# Patient Record
Sex: FEMALE | Race: WHITE | NOT HISPANIC OR LATINO | Employment: PART TIME | URBAN - METROPOLITAN AREA
[De-identification: names, ages, dates, MRNs, and addresses within clinical notes are randomized per-mention and may not be internally consistent; named-entity substitution may affect disease eponyms.]

---
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
## 2024-03-23 ENCOUNTER — HOSPITAL ENCOUNTER (INPATIENT)
Facility: HOSPITAL | Age: 19
LOS: 1 days | Discharge: HOME | DRG: 123 | End: 2024-03-26
Attending: STUDENT IN AN ORGANIZED HEALTH CARE EDUCATION/TRAINING PROGRAM | Admitting: INTERNAL MEDICINE
Payer: COMMERCIAL

## 2024-03-23 DIAGNOSIS — R07.9 CHEST PAIN: ICD-10-CM

## 2024-03-23 DIAGNOSIS — H54.61 VISION LOSS OF RIGHT EYE: ICD-10-CM

## 2024-03-23 DIAGNOSIS — H47.10 OPTIC NERVE EDEMA: Primary | ICD-10-CM

## 2024-03-23 DIAGNOSIS — H47.10 OPTIC DISC EDEMA: ICD-10-CM

## 2024-03-23 LAB
(HCYS)2 SERPL-MCNC: 7.2 UMOL/L (ref 5.1–15.4)
ALBUMIN SERPL-MCNC: 4.3 G/DL (ref 3.5–5)
ALBUMIN/GLOB SERPL: 1 RATIO (ref 1.1–2)
ALP SERPL-CCNC: 71 UNIT/L (ref 40–150)
ALT SERPL-CCNC: 18 UNIT/L (ref 0–55)
APPEARANCE UR: CLEAR
APTT PPP: 34.4 SECONDS (ref 23.2–33.7)
AST SERPL-CCNC: 19 UNIT/L (ref 5–34)
B-HCG UR QL: NEGATIVE
BACTERIA #/AREA URNS AUTO: ABNORMAL /HPF
BASOPHILS # BLD AUTO: 0.04 X10(3)/MCL
BASOPHILS NFR BLD AUTO: 0.4 %
BILIRUB SERPL-MCNC: 0.4 MG/DL
BILIRUB UR QL STRIP.AUTO: NEGATIVE
BUN SERPL-MCNC: 13.8 MG/DL (ref 7–18.7)
CALCIUM SERPL-MCNC: 11.1 MG/DL (ref 8.4–10.2)
CHLORIDE SERPL-SCNC: 107 MMOL/L (ref 98–107)
CO2 SERPL-SCNC: 22 MMOL/L (ref 22–29)
COLOR UR AUTO: ABNORMAL
CREAT SERPL-MCNC: 0.9 MG/DL (ref 0.55–1.02)
CTP QC/QA: YES
D DIMER PPP IA.FEU-MCNC: 0.31 UG/ML FEU (ref 0–0.5)
EOSINOPHIL # BLD AUTO: 0.07 X10(3)/MCL (ref 0–0.9)
EOSINOPHIL NFR BLD AUTO: 0.6 %
ERYTHROCYTE [DISTWIDTH] IN BLOOD BY AUTOMATED COUNT: 17.2 % (ref 11.5–17)
EST. AVERAGE GLUCOSE BLD GHB EST-MCNC: 99.7 MG/DL
FIBRINOGEN PPP-MCNC: 376 MG/DL (ref 210–463)
GFR SERPLBLD CREATININE-BSD FMLA CKD-EPI: >60 MLS/MIN/1.73/M2
GLOBULIN SER-MCNC: 4.3 GM/DL (ref 2.4–3.5)
GLUCOSE SERPL-MCNC: 101 MG/DL (ref 74–100)
GLUCOSE UR QL STRIP.AUTO: NORMAL
HBA1C MFR BLD: 5.1 %
HCT VFR BLD AUTO: 41.4 % (ref 37–47)
HGB BLD-MCNC: 13.4 G/DL (ref 12–16)
HIV 1+2 AB+HIV1 P24 AG SERPL QL IA: NONREACTIVE
HOLD SPECIMEN: NORMAL
HOLD SPECIMEN: NORMAL
HYALINE CASTS #/AREA URNS LPF: ABNORMAL /LPF
IMM GRANULOCYTES # BLD AUTO: 0.06 X10(3)/MCL (ref 0–0.04)
IMM GRANULOCYTES NFR BLD AUTO: 0.5 %
INR PPP: 1
KETONES UR QL STRIP.AUTO: NEGATIVE
LEUKOCYTE ESTERASE UR QL STRIP.AUTO: NEGATIVE
LYMPHOCYTES # BLD AUTO: 1.12 X10(3)/MCL (ref 0.6–4.6)
LYMPHOCYTES NFR BLD AUTO: 10.2 %
MAGNESIUM SERPL-MCNC: 1.9 MG/DL (ref 1.6–2.6)
MCH RBC QN AUTO: 26.8 PG (ref 27–31)
MCHC RBC AUTO-ENTMCNC: 32.4 G/DL (ref 33–36)
MCV RBC AUTO: 82.8 FL (ref 80–94)
MONOCYTES # BLD AUTO: 0.24 X10(3)/MCL (ref 0.1–1.3)
MONOCYTES NFR BLD AUTO: 2.2 %
MUCOUS THREADS URNS QL MICRO: ABNORMAL /LPF
NEUTROPHILS # BLD AUTO: 9.43 X10(3)/MCL (ref 2.1–9.2)
NEUTROPHILS NFR BLD AUTO: 86.1 %
NITRITE UR QL STRIP.AUTO: NEGATIVE
NRBC BLD AUTO-RTO: 0 %
PH UR STRIP.AUTO: 5.5 [PH]
PLATELET # BLD AUTO: 384 X10(3)/MCL (ref 130–400)
PMV BLD AUTO: 10.1 FL (ref 7.4–10.4)
POCT GLUCOSE: 183 MG/DL (ref 70–110)
POCT GLUCOSE: 199 MG/DL (ref 70–110)
POTASSIUM SERPL-SCNC: 4.3 MMOL/L (ref 3.5–5.1)
PROT SERPL-MCNC: 8.6 GM/DL (ref 6.4–8.3)
PROT UR QL STRIP.AUTO: NEGATIVE
PROTHROMBIN TIME: 13.3 SECONDS (ref 11.4–14)
RBC # BLD AUTO: 5 X10(6)/MCL (ref 4.2–5.4)
RBC #/AREA URNS AUTO: ABNORMAL /HPF
RBC UR QL AUTO: NEGATIVE
SODIUM SERPL-SCNC: 139 MMOL/L (ref 136–145)
SP GR UR STRIP.AUTO: 1.02 (ref 1–1.03)
SQUAMOUS #/AREA URNS LPF: ABNORMAL /HPF
T PALLIDUM AB SER QL: NONREACTIVE
UROBILINOGEN UR STRIP-ACNC: NORMAL
WBC # SPEC AUTO: 10.96 X10(3)/MCL (ref 4.5–11.5)
WBC #/AREA URNS AUTO: ABNORMAL /HPF

## 2024-03-23 PROCEDURE — 81001 URINALYSIS AUTO W/SCOPE: CPT | Performed by: STUDENT IN AN ORGANIZED HEALTH CARE EDUCATION/TRAINING PROGRAM

## 2024-03-23 PROCEDURE — 85730 THROMBOPLASTIN TIME PARTIAL: CPT | Performed by: STUDENT IN AN ORGANIZED HEALTH CARE EDUCATION/TRAINING PROGRAM

## 2024-03-23 PROCEDURE — 83529 ASAY OF INTERLEUKIN-6 (IL-6): CPT | Performed by: STUDENT IN AN ORGANIZED HEALTH CARE EDUCATION/TRAINING PROGRAM

## 2024-03-23 PROCEDURE — 80053 COMPREHEN METABOLIC PANEL: CPT | Performed by: STUDENT IN AN ORGANIZED HEALTH CARE EDUCATION/TRAINING PROGRAM

## 2024-03-23 PROCEDURE — 83520 IMMUNOASSAY QUANT NOS NONAB: CPT | Performed by: STUDENT IN AN ORGANIZED HEALTH CARE EDUCATION/TRAINING PROGRAM

## 2024-03-23 PROCEDURE — 25000003 PHARM REV CODE 250: Performed by: STUDENT IN AN ORGANIZED HEALTH CARE EDUCATION/TRAINING PROGRAM

## 2024-03-23 PROCEDURE — 96365 THER/PROPH/DIAG IV INF INIT: CPT

## 2024-03-23 PROCEDURE — 85379 FIBRIN DEGRADATION QUANT: CPT | Performed by: STUDENT IN AN ORGANIZED HEALTH CARE EDUCATION/TRAINING PROGRAM

## 2024-03-23 PROCEDURE — 85610 PROTHROMBIN TIME: CPT | Performed by: STUDENT IN AN ORGANIZED HEALTH CARE EDUCATION/TRAINING PROGRAM

## 2024-03-23 PROCEDURE — 86780 TREPONEMA PALLIDUM: CPT | Performed by: STUDENT IN AN ORGANIZED HEALTH CARE EDUCATION/TRAINING PROGRAM

## 2024-03-23 PROCEDURE — 85300 ANTITHROMBIN III ACTIVITY: CPT | Performed by: STUDENT IN AN ORGANIZED HEALTH CARE EDUCATION/TRAINING PROGRAM

## 2024-03-23 PROCEDURE — 85303 CLOT INHIBIT PROT C ACTIVITY: CPT | Performed by: STUDENT IN AN ORGANIZED HEALTH CARE EDUCATION/TRAINING PROGRAM

## 2024-03-23 PROCEDURE — 86147 CARDIOLIPIN ANTIBODY EA IG: CPT | Performed by: INTERNAL MEDICINE

## 2024-03-23 PROCEDURE — 25500020 PHARM REV CODE 255

## 2024-03-23 PROCEDURE — 87389 HIV-1 AG W/HIV-1&-2 AB AG IA: CPT | Performed by: STUDENT IN AN ORGANIZED HEALTH CARE EDUCATION/TRAINING PROGRAM

## 2024-03-23 PROCEDURE — 83695 ASSAY OF LIPOPROTEIN(A): CPT | Performed by: STUDENT IN AN ORGANIZED HEALTH CARE EDUCATION/TRAINING PROGRAM

## 2024-03-23 PROCEDURE — 85613 RUSSELL VIPER VENOM DILUTED: CPT | Performed by: STUDENT IN AN ORGANIZED HEALTH CARE EDUCATION/TRAINING PROGRAM

## 2024-03-23 PROCEDURE — 81025 URINE PREGNANCY TEST: CPT | Performed by: STUDENT IN AN ORGANIZED HEALTH CARE EDUCATION/TRAINING PROGRAM

## 2024-03-23 PROCEDURE — 83735 ASSAY OF MAGNESIUM: CPT | Performed by: STUDENT IN AN ORGANIZED HEALTH CARE EDUCATION/TRAINING PROGRAM

## 2024-03-23 PROCEDURE — 96372 THER/PROPH/DIAG INJ SC/IM: CPT

## 2024-03-23 PROCEDURE — 25000003 PHARM REV CODE 250

## 2024-03-23 PROCEDURE — 99285 EMERGENCY DEPT VISIT HI MDM: CPT | Mod: 25

## 2024-03-23 PROCEDURE — 82787 IGG 1 2 3 OR 4 EACH: CPT | Performed by: STUDENT IN AN ORGANIZED HEALTH CARE EDUCATION/TRAINING PROGRAM

## 2024-03-23 PROCEDURE — G0378 HOSPITAL OBSERVATION PER HR: HCPCS

## 2024-03-23 PROCEDURE — 63600175 PHARM REV CODE 636 W HCPCS

## 2024-03-23 PROCEDURE — 83090 ASSAY OF HOMOCYSTEINE: CPT | Performed by: STUDENT IN AN ORGANIZED HEALTH CARE EDUCATION/TRAINING PROGRAM

## 2024-03-23 PROCEDURE — 83036 HEMOGLOBIN GLYCOSYLATED A1C: CPT

## 2024-03-23 PROCEDURE — 85384 FIBRINOGEN ACTIVITY: CPT | Performed by: STUDENT IN AN ORGANIZED HEALTH CARE EDUCATION/TRAINING PROGRAM

## 2024-03-23 PROCEDURE — 86363 MOG-IGG1 ANTB FLO CYTMTRY EA: CPT | Performed by: STUDENT IN AN ORGANIZED HEALTH CARE EDUCATION/TRAINING PROGRAM

## 2024-03-23 PROCEDURE — 85025 COMPLETE CBC W/AUTO DIFF WBC: CPT | Performed by: STUDENT IN AN ORGANIZED HEALTH CARE EDUCATION/TRAINING PROGRAM

## 2024-03-23 PROCEDURE — 86053 AQAPRN-4 ANTB FLO CYTMTRY EA: CPT | Performed by: STUDENT IN AN ORGANIZED HEALTH CARE EDUCATION/TRAINING PROGRAM

## 2024-03-23 PROCEDURE — 63600175 PHARM REV CODE 636 W HCPCS: Performed by: STUDENT IN AN ORGANIZED HEALTH CARE EDUCATION/TRAINING PROGRAM

## 2024-03-23 RX ORDER — PREDNISONE 20 MG/1
60 TABLET ORAL DAILY
Status: ON HOLD | COMMUNITY
End: 2024-03-26

## 2024-03-23 RX ORDER — OXYBUTYNIN CHLORIDE 5 MG/1
15 TABLET, EXTENDED RELEASE ORAL DAILY
Status: DISCONTINUED | OUTPATIENT
Start: 2024-03-24 | End: 2024-03-26 | Stop reason: HOSPADM

## 2024-03-23 RX ORDER — SODIUM CHLORIDE 0.9 % (FLUSH) 0.9 %
10 SYRINGE (ML) INJECTION EVERY 12 HOURS PRN
Status: DISCONTINUED | OUTPATIENT
Start: 2024-03-23 | End: 2024-03-26 | Stop reason: HOSPADM

## 2024-03-23 RX ORDER — VALACYCLOVIR HYDROCHLORIDE 1 G/1
1000 TABLET, FILM COATED ORAL 2 TIMES DAILY
COMMUNITY

## 2024-03-23 RX ORDER — ENOXAPARIN SODIUM 100 MG/ML
40 INJECTION SUBCUTANEOUS EVERY 24 HOURS
Status: DISCONTINUED | OUTPATIENT
Start: 2024-03-23 | End: 2024-03-26 | Stop reason: HOSPADM

## 2024-03-23 RX ORDER — IBUPROFEN 200 MG
16 TABLET ORAL
Status: DISCONTINUED | OUTPATIENT
Start: 2024-03-23 | End: 2024-03-26 | Stop reason: HOSPADM

## 2024-03-23 RX ORDER — IBUPROFEN 200 MG
400 TABLET ORAL 2 TIMES DAILY PRN
COMMUNITY

## 2024-03-23 RX ORDER — NALOXONE HCL 0.4 MG/ML
0.02 VIAL (ML) INJECTION
Status: DISCONTINUED | OUTPATIENT
Start: 2024-03-23 | End: 2024-03-26 | Stop reason: HOSPADM

## 2024-03-23 RX ORDER — METHOTREXATE 2.5 MG/1
7.5 TABLET ORAL
COMMUNITY

## 2024-03-23 RX ORDER — GLUCAGON 1 MG
1 KIT INJECTION
Status: DISCONTINUED | OUTPATIENT
Start: 2024-03-23 | End: 2024-03-26 | Stop reason: HOSPADM

## 2024-03-23 RX ORDER — LAMOTRIGINE 25 MG/1
50 TABLET ORAL NIGHTLY
Status: DISCONTINUED | OUTPATIENT
Start: 2024-03-23 | End: 2024-03-26 | Stop reason: HOSPADM

## 2024-03-23 RX ORDER — TALC
5 POWDER (GRAM) TOPICAL NIGHTLY PRN
COMMUNITY

## 2024-03-23 RX ORDER — IBUPROFEN 200 MG
24 TABLET ORAL
Status: DISCONTINUED | OUTPATIENT
Start: 2024-03-23 | End: 2024-03-26 | Stop reason: HOSPADM

## 2024-03-23 RX ORDER — INSULIN ASPART 100 [IU]/ML
0-5 INJECTION, SOLUTION INTRAVENOUS; SUBCUTANEOUS
Status: DISCONTINUED | OUTPATIENT
Start: 2024-03-23 | End: 2024-03-26 | Stop reason: HOSPADM

## 2024-03-23 RX ADMIN — ENOXAPARIN SODIUM 40 MG: 40 INJECTION SUBCUTANEOUS at 05:03

## 2024-03-23 RX ADMIN — IOHEXOL 75 ML: 350 INJECTION, SOLUTION INTRAVENOUS at 01:03

## 2024-03-23 RX ADMIN — DEXTROSE MONOHYDRATE 1000 MG: 50 INJECTION, SOLUTION INTRAVENOUS at 04:03

## 2024-03-23 RX ADMIN — LAMOTRIGINE 50 MG: 25 TABLET ORAL at 08:03

## 2024-03-23 NOTE — ED PROVIDER NOTES
Encounter Date: 3/23/2024       History     Chief Complaint   Patient presents with    Eye Problem     C/o loss of vision right eye last 2 weeks. States her eye dr in [place] has referred her to optho clinic here and was told to come today for mri by dr. Dudley.      Patient presents to the emergency department due to vision loss in her right eye.  She reports for proximally 6 months now she has had pain and redness in her right eye.  Initially she was being treated and worked up for anterior scleritis.  She was states he was had worsening vision loss of now can almost not completely see on her right eye at all, it was told she would swelling on her optic nerve on an ultrasound yesterday and was referred to the ER for further workup.  No weakness or numbness in her extremities.    The history is provided by the patient.     Review of patient's allergies indicates:  No Known Allergies  Past Medical History:   Diagnosis Date    ADHD (attention deficit hyperactivity disorder)     Anxiety     Bipolar disorder, unspecified     Depression     Hyperhidrosis      Past Surgical History:   Procedure Laterality Date    ADENOIDECTOMY      TONSILLECTOMY       Family History   Problem Relation Name Age of Onset    Arthritis Mother Caitlyn     Depression Mother Caitlyn     Mental illness Mother Caitlyn     Miscarriages / Stillbirths Mother Caitlyn     No Known Problems Father      No Known Problems Sister      No Known Problems Brother      Arthritis Maternal Grandmother      Arthritis Maternal Grandfather Fco     Drug abuse Maternal Grandfather Fco     Rheum arthritis Maternal Grandfather Fco     COPD Paternal Grandfather Fco     Lupus Maternal Cousin       Social History     Tobacco Use    Smoking status: Some Days     Current packs/day: 0.02     Types: Cigarettes    Smokeless tobacco: Never   Substance Use Topics    Alcohol use: Yes     Alcohol/week: 1.0 standard drink of alcohol     Types: 1 Glasses of wine per week      Comment: socially    Drug use: Never     Review of Systems   Constitutional:  Negative for chills and fever.   HENT:  Negative for congestion and sore throat.    Eyes:  Positive for pain, redness and visual disturbance.   Respiratory:  Negative for cough and shortness of breath.    Cardiovascular:  Negative for chest pain and palpitations.   Gastrointestinal:  Negative for abdominal pain and nausea.   Genitourinary:  Negative for dysuria and hematuria.   Musculoskeletal:  Negative for arthralgias and myalgias.   Neurological:  Negative for dizziness and weakness.       Physical Exam     Initial Vitals [03/23/24 1124]   BP Pulse Resp Temp SpO2   (!) 135/97 77 20 97.5 °F (36.4 °C) 99 %      MAP       --         Physical Exam    Nursing note and vitals reviewed.  Constitutional: She appears well-developed and well-nourished.   HENT:   Head: Normocephalic and atraumatic.   Eyes: EOM are normal. Pupils are equal, round, and reactive to light.   Conjunctival injection on the right   Neck: Neck supple.   Normal range of motion.  Cardiovascular:  Normal rate and regular rhythm.           Pulmonary/Chest: Breath sounds normal. No respiratory distress.   Abdominal: Abdomen is soft. There is no abdominal tenderness.   Musculoskeletal:         General: No edema. Normal range of motion.      Cervical back: Normal range of motion and neck supple.     Neurological: She is alert and oriented to person, place, and time.   Skin: Skin is warm and dry.         ED Course   Procedures  Labs Reviewed   COMPREHENSIVE METABOLIC PANEL - Abnormal; Notable for the following components:       Result Value    Glucose Level 101 (*)     Calcium Level Total 11.1 (*)     Protein Total 8.6 (*)     Globulin 4.3 (*)     Albumin/Globulin Ratio 1.0 (*)     All other components within normal limits   URINALYSIS, REFLEX TO URINE CULTURE - Abnormal; Notable for the following components:    Bacteria, UA Trace (*)     Squamous Epithelial Cells, UA Occ (*)      Mucous, UA Trace (*)     All other components within normal limits   CBC WITH DIFFERENTIAL - Abnormal; Notable for the following components:    MCH 26.8 (*)     MCHC 32.4 (*)     RDW 17.2 (*)     Neut # 9.43 (*)     IG# 0.06 (*)     All other components within normal limits   APTT - Abnormal; Notable for the following components:    PTT 34.4 (*)     All other components within normal limits   MAGNESIUM - Normal   PROTIME-INR - Normal   D DIMER, QUANTITATIVE - Normal   HOMOCYSTEINE, SERUM - Normal   SYPHILIS ANTIBODY (WITH REFLEX RPR) - Normal   HIV 1 / 2 ANTIBODY - Normal   CBC W/ AUTO DIFFERENTIAL    Narrative:     The following orders were created for panel order CBC auto differential.  Procedure                               Abnormality         Status                     ---------                               -----------         ------                     CBC with Differential[[id number]]       Abnormal            Final result                 Please view results for these tests on the individual orders.   POCT URINE PREGNANCY          Imaging Results              CT Head W Wo Contrast (Final result)  Result time 03/23/24 14:17:13      Final result by Cristina Sherwood MD (03/23/24 14:17:13)                   Impression:      No acute intracranial abnormality.      Electronically signed by: Cristina Sherwood  Date:    03/23/2024  Time:    14:17               Narrative:    EXAMINATION:  CT HEAD WITH AND WITHOUT    CLINICAL HISTORY:  vision loss, papiledema;    TECHNIQUE:  Axial scans were obtained from skull base to the vertex with and without contrast.    Coronal and sagittal reconstructions obtained from the axial data.    Automatic exposure control was utilized to limit radiation dose.    Radiation Dose:    Total DLP: 1731 mGy*cm    COMPARISON:  None available    FINDINGS:  There is no acute intracranial hemorrhage or edema. The gray-white matter differentiation is preserved. There is no abnormal  intracranial enhancement    There is no mass effect or midline shift. The ventricles and sulci are normal in size. The basal cisterns are patent. There is no abnormal extra-axial fluid collection. The major vessels enhance normally.    The calvarium and skull base are intact. The visualized paranasal sinuses and the mastoid air cells are clear.                                       CT Orbits Sella Post Fossa W Wo Contrast (Final result)  Result time 03/23/24 14:19:57      Final result by Cristian Sherwood MD (03/23/24 14:19:57)                   Impression:      No acute abnormality of the orbits.      Electronically signed by: Cristina Sherwood  Date:    03/23/2024  Time:    14:19               Narrative:    EXAMINATION:  CT ORBITS SELLA POST FOSSA W WO CONTRAST    CLINICAL HISTORY:  Vision loss, papiledema;    TECHNIQUE:  Axial scans were obtained through the orbits with and without contrast.    Coronal and sagittal reconstructions obtained from the axial data.    Automatic exposure control was utilized to limit radiation dose.    Radiation Dose:    Total DLP: 1731 mGy*cm    COMPARISON:  None available    FINDINGS:  The globes are normal in contour.  The optic nerves are intact.  The extraocular muscles are symmetric in size.  There is no postseptal abnormality of the orbits.  The lacrimal gland unremarkable.  Meckel's caves and the cavernous sinuses are normal in appearance.  There is no acute fracture or destructive bone lesion.  The paranasal sinuses are clear.                                       Medications   iohexoL (OMNIPAQUE 350) 350 mg iodine/mL injection (75 mLs Intravenous Given 3/23/24 1315)   methylPREDNISolone sodium succinate (SOLU-MEDROL) 1,000 mg in dextrose 5 % (D5W) 100 mL IVPB (0 mg Intravenous Stopped 3/23/24 1642)   lactated ringers bolus 1,000 mL (1,000 mLs Intravenous Not Given 3/24/24 1100)   methylPREDNISolone sodium succinate (SOLU-MEDROL) 500 mg in dextrose 5 % (D5W) 100 mL IVPB (0  mg Intravenous Stopped 3/25/24 1930)   diphenhydrAMINE capsule 25 mg (25 mg Oral Given 3/25/24 1836)   gadobenate dimeglumine (MULTIHANCE) 529 mg/mL (0.1mmol/0.2mL) injection (20 mLs Intravenous Given 3/25/24 1255)   HYDROmorphone injection 0.5 mg (0.5 mg Intravenous Not Given 3/25/24 1615)   methylPREDNISolone sodium succinate (SOLU-MEDROL) 500 mg in dextrose 5 % (D5W) 100 mL IVPB (0 mg Intravenous Stopped 3/26/24 1331)     Medical Decision Making    Discussed the patient with ophthalmology on patient was arrival, recommended CT brain and CT orbits with and without contrast, these were both negative for any acute process.   CBC, CMP were generally reassuring.  Ophthalmology recommended medicine admission, discussed with the Internal Medicine team who will admit for further evaluation.    Amount and/or Complexity of Data Reviewed  Labs: ordered. Decision-making details documented in ED Course.  Radiology: ordered. Decision-making details documented in ED Course.                                      Clinical Impression:  Final diagnoses:  [H47.10] Optic nerve edema (Primary)  [H54.61] Vision loss of right eye          ED Disposition Condition    Observation                 Eliud Tuttle MD  04/19/24 7388

## 2024-03-23 NOTE — LETTER
Patient: Alma Ca  YOB: 2005  Date: 3/23/2024 Time: 11:50 AM  Location: [facility]    Leaving the Hospital Against Medical Advice    Chart #:[id number]    This will certify that I, the undersigned,    ______________________________________________________________________    A patient in the above named medical center, having requested discharge and removal from the medical center against the advice of my attending physician(s), hereby release [facility], its physicians, officers and employees, severally and individually, from any and all liability of any nature whatsoever for any injury or harm or complication of any kind that may result directly or indirectly, by reason of my terminating my stay as a patient at [facility] and my departure from [facility], and hereby waive any and all rights of action I may now have or later acquire as a result of my voluntary departure from [facility] and the termination of my stay as a patient therein.    This release is made with the full knowledge of the danger that may result from the action which I am taking.      Date:_______________________                         ___________________________                                                                                    Patient/Legal Representative    Witness:        ____________________________                          ___________________________  Nurse                                                                        Physician     Yes

## 2024-03-23 NOTE — CONSULTS
Consultation Report  Ophthalmology Service    Date: 03/23/2024    Chief complaint/Reason for Consult: optic nerve head edema, right eye     History of Present Illness: Alma Ca is a 19 y.o. female with complex ocular history as below who presents with 2 weeks of blurred vision of her right eye. Timeline per patient and mother:    August: crusting and redness of right eye, tx with abx drops, did not improve  Sept-Nov: treated by optom for redness, pain that woke patient up. Up to 100mg prednisone a day which helped but could not successfully taper down  Dec-Jan: seen by Brian, started on Brimonidine  Jan-Feb: seen by rheumatology, negative workup, started on MTX 6 pills per week  Feb-March: seen by Jose R, treated with Valtrex with some improvement, off steroids  March ~21st: 2 weeks of blurred vision with sudden onset noticed first thing in the morning also with blue photopsias. No change since. Otherwise symptoms stable.    Family Hx: +psoriatic arthritis (mother) family history includes No Known Problems in her brother, father, mother, and sister.     PMHx:  has a past medical history of ADHD (attention deficit hyperactivity disorder), Anxiety, Bipolar disorder, unspecified, Depression, and Hyperhidrosis.     PSurgHx:  has a past surgical history that includes Tonsillectomy and Adenoidectomy.     Home Medications:   Prior to Admission medications    Medication Sig Start Date End Date Taking? Authorizing Provider   guanfacine HCl (INTUNIV ER ORAL) Intuniv ER Take No date recorded No form recorded No frequency recorded No route recorded No set duration recorded No set duration amount recorded active No dosage strength recorded No dosage strength units of measure recorded    Provider, Historical   lamoTRIgine (LAMICTAL) 25 MG tablet Take 50 mg by mouth every evening. 3/14/23   Provider, Historical   oxybutynin (DITROPAN XL) 15 MG TR24 Take 15 mg by mouth. 4/17/23   Provider, Historical   sertraline (ZOLOFT)  50 MG tablet Take 75 mg by mouth every morning. 6/1/23   Provider, Historical        Medications this encounter:    iohexoL           Allergies: has No Known Allergies.     Social:  reports that she has been smoking cigarettes. She has never used smokeless tobacco. She reports that she does not currently use alcohol. She reports that she does not use drugs.     ROS: As per HPI    Ocular examination/Dilated fundus examination:  Base Eye Exam       Visual Acuity (Snellen - Linear)         Right Left    Dist cc  20/20    Near cc 20/800               Tonometry (Tonopen, 12:37 PM)         Right Left    Pressure 13 14              Pupils         Dark Light Shape React APD    Right 6 5 Round Sluggish 4+    Left 6 3 Round Brisk None              Visual Fields         Right Left      Full    Restrictions Total inferior nasal deficiency; Partial outer superior temporal, inferior temporal deficiencies    Complete IN loss, relative IT/ST loss, intact ST OD             Extraocular Movement         Right Left     Full Full              Dilation       Both eyes: 1% Mydriacyl, 2.5% Phenylephrine @ 12:36 PM                  Slit Lamp and Fundus Exam       External Exam         Right Left    External Normal, normal resistance to retropulsion, no gross exophthalmos Normal              Slit Lamp Exam         Right Left    Lids/Lashes Normal Normal    Conjunctiva/Sclera 1-2+ inj, worst inf White and quiet    Cornea Clear Clear    Anterior Chamber Deep and quiet Deep and quiet    Iris round, sluggish Round and reactive    Lens Clear Clear    Anterior Vitreous Normal Normal              Fundus Exam         Right Left    Disc grade 3 onh edema, extension of fluid 3-4DD circumferentially. Flame heme IN 1 DD away Pink/sharp    C/D Ratio  <0.1    Macula nasal fluid not quite to fovea Flat    Vessels Normal Normal    Periphery Flat w/o heme/tear/detach Flat w/o heme/tear/detach                      Assessment/Plan:     1. Right optic nerve  head edema  - Scleritis treated since 8/23 with varying amounts of oral prednisone and starting in ~March with Valtrex. Had some improvement of symptoms.  - Initial examination 3/23/24: 2 weeks of blurred vision right eye, sudden onset first noticed in the morning without worse pain than usual. No improvement. Started on 60mg PO pred 3/22/24, sent to ED for evaluation.  -Right eye: 20/800, noel APD. Spared SN visual field, complete loss IN, relative loss ST/IT. 3+ ONH edema with tracking fluid and mild heme. Left eye: unremarkable except <0.1 cup/disc  - Labwork: has had extensive infectious and inflammatory workup for scleritis performed previously, requesting records  Ordered: CSF studies, hypercoagulable workup, syphilis, HIV, NMO/MOG serum abs, IGG4, soluble IL-2 receptor, IL6  - Imaging:  CT brain/orbit w/wo ordered (unable to obtain CTV)  Unable to obtain MRI/MRV until Monday 3/25/24  - Differential: demyelinating (atypical optic neuritis), NAION with concern for hypercoagulable state, infectious, inflammatory, mass    Recommendations:  - Request medicine evaluation for admission - appreciate assistance  - CT imaging as above  - LP if no mass  - Labs as above (placed)  - Start 1000mg IV solu medrol x3 days  - If no clinical improvement after 1-2 days and mri shows demyelination, will need to discuss plasmapheresis      Please text/call if there are questions or concerns.  [phone]      Frankie Garcia MD PGY-4  [employer] Ophthalmology Resident  03/23/2024  1:02 PM

## 2024-03-24 LAB
ALBUMIN SERPL-MCNC: 4 G/DL (ref 3.5–5)
ALBUMIN/GLOB SERPL: 1 RATIO (ref 1.1–2)
ALP SERPL-CCNC: 62 UNIT/L (ref 40–150)
ALT SERPL-CCNC: 17 UNIT/L (ref 0–55)
AST SERPL-CCNC: 15 UNIT/L (ref 5–34)
BASOPHILS # BLD AUTO: 0.02 X10(3)/MCL
BASOPHILS NFR BLD AUTO: 0.1 %
BILIRUB SERPL-MCNC: 0.3 MG/DL
BUN SERPL-MCNC: 12.8 MG/DL (ref 7–18.7)
CALCIUM SERPL-MCNC: 10.7 MG/DL (ref 8.4–10.2)
CALCIUM UR-MCNC: 9.2 MG/DL
CHLORIDE SERPL-SCNC: 106 MMOL/L (ref 98–107)
CO2 SERPL-SCNC: 22 MMOL/L (ref 22–29)
CREAT SERPL-MCNC: 0.79 MG/DL (ref 0.55–1.02)
CREAT UR-MCNC: 65.2 MG/DL (ref 45–106)
DEPRECATED CALCIDIOL+CALCIFEROL SERPL-MC: 29.5 NG/ML (ref 30–80)
EOSINOPHIL # BLD AUTO: 0 X10(3)/MCL (ref 0–0.9)
EOSINOPHIL NFR BLD AUTO: 0 %
ERYTHROCYTE [DISTWIDTH] IN BLOOD BY AUTOMATED COUNT: 16.7 % (ref 11.5–17)
FACT VIII ACT/NOR PPP: 183 % (ref 59–191)
GFR SERPLBLD CREATININE-BSD FMLA CKD-EPI: >60 MLS/MIN/1.73/M2
GLOBULIN SER-MCNC: 4.1 GM/DL (ref 2.4–3.5)
GLUCOSE SERPL-MCNC: 139 MG/DL (ref 74–100)
HCT VFR BLD AUTO: 39.1 % (ref 37–47)
HGB BLD-MCNC: 12.5 G/DL (ref 12–16)
IMM GRANULOCYTES # BLD AUTO: 0.11 X10(3)/MCL (ref 0–0.04)
IMM GRANULOCYTES NFR BLD AUTO: 0.6 %
LYMPHOCYTES # BLD AUTO: 1.05 X10(3)/MCL (ref 0.6–4.6)
LYMPHOCYTES NFR BLD AUTO: 5.5 %
MAGNESIUM SERPL-MCNC: 1.7 MG/DL (ref 1.6–2.6)
MCH RBC QN AUTO: 26.5 PG (ref 27–31)
MCHC RBC AUTO-ENTMCNC: 32 G/DL (ref 33–36)
MCV RBC AUTO: 83 FL (ref 80–94)
MONOCYTES # BLD AUTO: 0.19 X10(3)/MCL (ref 0.1–1.3)
MONOCYTES NFR BLD AUTO: 1 %
NEUTROPHILS # BLD AUTO: 17.56 X10(3)/MCL (ref 2.1–9.2)
NEUTROPHILS NFR BLD AUTO: 92.8 %
NRBC BLD AUTO-RTO: 0 %
PHOSPHATE SERPL-MCNC: 2.6 MG/DL (ref 2.3–4.7)
PLATELET # BLD AUTO: 381 X10(3)/MCL (ref 130–400)
PMV BLD AUTO: 10.4 FL (ref 7.4–10.4)
POCT GLUCOSE: 135 MG/DL (ref 70–110)
POCT GLUCOSE: 163 MG/DL (ref 70–110)
POCT GLUCOSE: 177 MG/DL (ref 70–110)
POCT GLUCOSE: 183 MG/DL (ref 70–110)
POTASSIUM SERPL-SCNC: 3.7 MMOL/L (ref 3.5–5.1)
PROT SERPL-MCNC: 8.1 GM/DL (ref 6.4–8.3)
PTH-INTACT SERPL-MCNC: 53.8 PG/ML (ref 8.7–77)
RBC # BLD AUTO: 4.71 X10(6)/MCL (ref 4.2–5.4)
SODIUM SERPL-SCNC: 138 MMOL/L (ref 136–145)
WBC # SPEC AUTO: 18.93 X10(3)/MCL (ref 4.5–11.5)

## 2024-03-24 PROCEDURE — 63600175 PHARM REV CODE 636 W HCPCS

## 2024-03-24 PROCEDURE — 83970 ASSAY OF PARATHORMONE: CPT

## 2024-03-24 PROCEDURE — G0378 HOSPITAL OBSERVATION PER HR: HCPCS

## 2024-03-24 PROCEDURE — 82306 VITAMIN D 25 HYDROXY: CPT

## 2024-03-24 PROCEDURE — 96361 HYDRATE IV INFUSION ADD-ON: CPT

## 2024-03-24 PROCEDURE — 84100 ASSAY OF PHOSPHORUS: CPT

## 2024-03-24 PROCEDURE — 96366 THER/PROPH/DIAG IV INF ADDON: CPT

## 2024-03-24 PROCEDURE — 82340 ASSAY OF CALCIUM IN URINE: CPT

## 2024-03-24 PROCEDURE — C9113 INJ PANTOPRAZOLE SODIUM, VIA: HCPCS

## 2024-03-24 PROCEDURE — 85025 COMPLETE CBC W/AUTO DIFF WBC: CPT

## 2024-03-24 PROCEDURE — 83735 ASSAY OF MAGNESIUM: CPT

## 2024-03-24 PROCEDURE — 25000003 PHARM REV CODE 250

## 2024-03-24 PROCEDURE — 81241 F5 GENE: CPT | Performed by: STUDENT IN AN ORGANIZED HEALTH CARE EDUCATION/TRAINING PROGRAM

## 2024-03-24 PROCEDURE — 85240 CLOT FACTOR VIII AHG 1 STAGE: CPT | Performed by: STUDENT IN AN ORGANIZED HEALTH CARE EDUCATION/TRAINING PROGRAM

## 2024-03-24 PROCEDURE — 82570 ASSAY OF URINE CREATININE: CPT

## 2024-03-24 PROCEDURE — 96372 THER/PROPH/DIAG INJ SC/IM: CPT

## 2024-03-24 PROCEDURE — 80053 COMPREHEN METABOLIC PANEL: CPT

## 2024-03-24 RX ORDER — DIPHENHYDRAMINE HCL 25 MG
50 CAPSULE ORAL EVERY 6 HOURS PRN
Status: DISCONTINUED | OUTPATIENT
Start: 2024-03-24 | End: 2024-03-26 | Stop reason: HOSPADM

## 2024-03-24 RX ORDER — LORAZEPAM 1 MG/1
1 TABLET ORAL EVERY 6 HOURS PRN
Status: DISCONTINUED | OUTPATIENT
Start: 2024-03-24 | End: 2024-03-26 | Stop reason: HOSPADM

## 2024-03-24 RX ORDER — PANTOPRAZOLE SODIUM 40 MG/10ML
40 INJECTION, POWDER, LYOPHILIZED, FOR SOLUTION INTRAVENOUS DAILY
Status: DISCONTINUED | OUTPATIENT
Start: 2024-03-24 | End: 2024-03-26 | Stop reason: HOSPADM

## 2024-03-24 RX ORDER — TALC
9 POWDER (GRAM) TOPICAL NIGHTLY PRN
Status: DISCONTINUED | OUTPATIENT
Start: 2024-03-24 | End: 2024-03-26 | Stop reason: HOSPADM

## 2024-03-24 RX ADMIN — LAMOTRIGINE 50 MG: 25 TABLET ORAL at 08:03

## 2024-03-24 RX ADMIN — OXYBUTYNIN CHLORIDE 15 MG: 5 TABLET, EXTENDED RELEASE ORAL at 08:03

## 2024-03-24 RX ADMIN — DIPHENHYDRAMINE HYDROCHLORIDE 50 MG: 25 CAPSULE ORAL at 09:03

## 2024-03-24 RX ADMIN — SERTRALINE HYDROCHLORIDE 75 MG: 50 TABLET ORAL at 07:03

## 2024-03-24 RX ADMIN — PANTOPRAZOLE SODIUM 40 MG: 40 INJECTION, POWDER, FOR SOLUTION INTRAVENOUS at 09:03

## 2024-03-24 RX ADMIN — ENOXAPARIN SODIUM 40 MG: 40 INJECTION SUBCUTANEOUS at 05:03

## 2024-03-24 RX ADMIN — LORAZEPAM 1 MG: 1 TABLET ORAL at 08:03

## 2024-03-24 RX ADMIN — METHYLPREDNISOLONE SODIUM SUCCINATE 1000 MG: 1 INJECTION, POWDER, LYOPHILIZED, FOR SOLUTION INTRAMUSCULAR; INTRAVENOUS at 08:03

## 2024-03-24 RX ADMIN — SODIUM CHLORIDE, POTASSIUM CHLORIDE, SODIUM LACTATE AND CALCIUM CHLORIDE 1000 ML: 600; 310; 30; 20 INJECTION, SOLUTION INTRAVENOUS at 09:03

## 2024-03-24 NOTE — H&P
[facility] Wards History & Physical Note    Resident Team: [facility] Medicine List 3  Attending Physician: Adis Lane MD  Resident: Chelsi Castro MD  Intern: Ryan Garcia MD    Subjective:      History of Present Illness:  Alma Ca is a 19 y.o.  American female with PMH of obesity, bipolar 2 disorder, attention deficit hyperactivity disorder, anxiety, depression, hyperhidrosis who presented to [facility] ED (3/23/2024) due to worsening vision in right eye.  Patient reports that symptoms initially began approximately a month ago (August 2023) with acute onset of right scleral injection and eye pain.  Patient is being followed by Ophthalmology plus Rheumatology and was being treated for active nearest scleritis with varying amounts of oral prednisone and Valtrex with only mild improvement in symptoms temporarily.  Beginning approximately 2 weeks ago patient noted acute onset vision loss to right eye such that she was unable to see objects in her right lateral, right medial, central visual fields.  Patient was seen by Ophthalmology post onset of symptoms at which time she was started on p.o. prednisone 60 mg but when patient did not experience improvement in symptoms times 24 hours she was referred to ED for evaluation.  Ophthalmology evaluated and consulted Internal Medicine for admission.    ED course:  Patient hypertensive but remaining vital signs stable.  CBC unremarkable.  CMP unremarkable.  Urinalysis unremarkable.  CT head with and without contrast unremarkable.  CT head with and without contrast plus CT orbit with and without contrast ophthalmology.    Past Medical History:  Past Medical History:   Diagnosis Date    ADHD (attention deficit hyperactivity disorder)     Anxiety     Bipolar disorder, unspecified     Depression     Hyperhidrosis      Past Surgical History:  Past Surgical History:   Procedure Laterality Date    ADENOIDECTOMY      TONSILLECTOMY       Family History:  Family  History   Problem Relation Age of Onset    No Known Problems Mother     No Known Problems Father     No Known Problems Sister     No Known Problems Brother      Social History:  Social History     Tobacco Use    Smoking status: Some Days     Types: Cigarettes    Smokeless tobacco: Never   Substance Use Topics    Alcohol use: Not Currently    Drug use: Never     Allergies:  Review of patient's allergies indicates:  No Known Allergies  Home Medications:  Prior to Admission medications    Medication Sig Start Date End Date Taking? Authorizing Provider   BIOTIN ORAL Take 200 mg by mouth once daily.   Yes Provider, Historical   ibuprofen (ADVIL,MOTRIN) 200 MG tablet Take 400 mg by mouth 2 (two) times daily as needed for Pain.   Yes Provider, Historical   methotrexate 2.5 MG Tab Take 7.5 mg by mouth every 7 days.   Yes Provider, Historical   NON FORMULARY MEDICATION Take 1 capsule by mouth once daily.   Yes Provider, Historical   predniSONE (DELTASONE) 20 MG tablet Take 60 mg by mouth once daily.   Yes Provider, Historical   valACYclovir (VALTREX) 1000 MG tablet Take 1,000 mg by mouth 2 (two) times daily.   Yes Provider, Historical   guanfacine HCl (INTUNIV ER ORAL) Intuniv ER Take No date recorded No form recorded No frequency recorded No route recorded No set duration recorded No set duration amount recorded active No dosage strength recorded No dosage strength units of measure recorded    Provider, Historical   lamoTRIgine (LAMICTAL) 25 MG tablet Take 50 mg by mouth every evening. 3/14/23   Provider, Historical   melatonin (MELATIN) 3 mg tablet Take 5 mg by mouth nightly as needed for Insomnia.    Provider, Historical   NON FORMULARY MEDICATION Take 1 capsule by mouth once daily.    Provider, Historical   oxybutynin (DITROPAN XL) 15 MG TR24 Take 15 mg by mouth. 4/17/23   Provider, Historical   sertraline (ZOLOFT) 50 MG tablet Take 75 mg by mouth every morning. 6/1/23   Provider, Historical     Review of  "Systems:  Review of Systems   Constitutional:  Negative for chills, diaphoresis, fatigue and fever.   HENT:  Negative for ear pain, hearing loss, rhinorrhea, sore throat, tinnitus and trouble swallowing.    Eyes:  Positive for pain, redness and visual disturbance.   Respiratory:  Negative for cough, chest tightness and shortness of breath.    Cardiovascular:  Negative for chest pain and palpitations.   Gastrointestinal:  Negative for abdominal pain, constipation, diarrhea, nausea and vomiting.   Genitourinary:  Negative for difficulty urinating, dysuria, frequency, hematuria and urgency.   Musculoskeletal:  Negative for arthralgias and myalgias.   Skin:  Negative for rash and wound.   Neurological:  Negative for dizziness, seizures, syncope, weakness, light-headedness, numbness and headaches.   Psychiatric/Behavioral:  Negative for confusion.         Objective:   Last 24 Hour Vital Signs:  BP  Min: 135/97  Max: 167/96  Temp  Av.1 °F (36.7 °C)  Min: 97.5 °F (36.4 °C)  Max: 98.7 °F (37.1 °C)  Pulse  Av.8  Min: 68  Max: 93  Resp  Av.6  Min: 17  Max: 20  SpO2  Av.3 %  Min: 97 %  Max: 99 %  Height  Av' 8" (172.7 cm)  Min: 5' 8" (172.7 cm)  Max: 5' 8" (172.7 cm)  Weight  Av.2 kg (243 lb 0.9 oz)  Min: 102.1 kg (225 lb 1.4 oz)  Max: 118.4 kg (261 lb 0.4 oz)  Body mass index is 39.69 kg/m².  I/O last 3 completed shifts:  In: 125.3 [IV Piggyback:125.3]  Out: -     Physical Examination:  Physical Exam  Vitals reviewed.   Constitutional:       General: She is not in acute distress.     Appearance: Normal appearance. She is obese. She is not ill-appearing, toxic-appearing or diaphoretic.   HENT:      Head: Normocephalic and atraumatic.      Right Ear: External ear normal.      Left Ear: External ear normal.   Eyes:      General: No scleral icterus.        Right eye: No discharge.         Left eye: No discharge.      Extraocular Movements: Extraocular movements intact.      Pupils: Pupils are equal, " round, and reactive to light.      Comments: Right sclera injected  Left sclera normal   Cardiovascular:      Rate and Rhythm: Normal rate and regular rhythm.      Pulses: Normal pulses.      Heart sounds: Normal heart sounds. No murmur heard.     No friction rub. No gallop.   Pulmonary:      Effort: Pulmonary effort is normal. No respiratory distress.      Breath sounds: Normal breath sounds. No wheezing, rhonchi or rales.   Abdominal:      General: Abdomen is flat. Bowel sounds are normal. There is no distension.      Palpations: Abdomen is soft.      Tenderness: There is no abdominal tenderness. There is no guarding.   Musculoskeletal:         General: No swelling, tenderness, deformity or signs of injury. Normal range of motion.      Right lower leg: No edema.      Left lower leg: No edema.   Skin:     General: Skin is warm and dry.      Capillary Refill: Capillary refill takes less than 2 seconds.      Coloration: Skin is not jaundiced.      Findings: No bruising, erythema or rash.   Neurological:      Mental Status: She is alert.      Comments: AAO to person, place, time, and situation; CN II-XII grossly intact       Laboratory:  Most Recent Data:  CBC:   Lab Results   Component Value Date    WBC 10.96 03/23/2024    HGB 13.4 03/23/2024    HCT 41.4 03/23/2024     03/23/2024    MCV 82.8 03/23/2024    RDW 17.2 (H) 03/23/2024     WBC Differential:   Recent Labs   Lab 03/23/24  1252   WBC 10.96   HGB 13.4   HCT 41.4      MCV 82.8     BMP:   Lab Results   Component Value Date     03/23/2024    K 4.3 03/23/2024    CO2 22 03/23/2024    BUN 13.8 03/23/2024    CREATININE 0.90 03/23/2024    CALCIUM 11.1 (H) 03/23/2024    MG 1.90 03/23/2024     LFTs:   Lab Results   Component Value Date    ALBUMIN 4.3 03/23/2024    BILITOT 0.4 03/23/2024    AST 19 03/23/2024    ALKPHOS 71 03/23/2024    ALT 18 03/23/2024     Coags:   Lab Results   Component Value Date    INR 1.0 03/23/2024    PROTIME 13.3 03/23/2024     "PTT 34.4 (H) 03/23/2024     FLP:   Lab Results   Component Value Date    CHOL 178 (H) 10/03/2023    HDL 44 10/03/2023    LDLCALC 121 (H) 10/03/2023    TRIG 69 10/03/2023     DM:   Lab Results   Component Value Date    HGBA1C 5.1 03/23/2024    LDLCALC 121 (H) 10/03/2023    CREATININE 0.90 03/23/2024     Thyroid:   Lab Results   Component Value Date    TSH 0.586 10/03/2023     Anemia: No results found for: "IRON", "TIBC", "FERRITIN", "UHPRZPMW16", "FOLATE"  Cardiac: No results found for: "TROPONINI", "CKTOTAL", "CKMB", "BNP"  Urinalysis:   Lab Results   Component Value Date    PHUA 5.5 03/23/2024    UROBILINOGEN Normal 03/23/2024    WBCUA 0-5 03/23/2024     Trended Lab Data:  Recent Labs   Lab 03/23/24  1252   WBC 10.96   HGB 13.4   HCT 41.4      MCV 82.8   RDW 17.2*      K 4.3   CO2 22   BUN 13.8   CREATININE 0.90   ALBUMIN 4.3   BILITOT 0.4   AST 19   ALKPHOS 71   ALT 18     Trended Cardiac Data:  No results for input(s): "TROPONINI", "CKTOTAL", "CKMB", "BNP" in the last 168 hours.  Radiology:  Imaging Results              CT Head W Wo Contrast (Final result)  Result time 03/23/24 14:17:13      Final result by Cristina Sherwood MD (03/23/24 14:17:13)                   Impression:      No acute intracranial abnormality.      Electronically signed by: Cristina Sherwood  Date:    03/23/2024  Time:    14:17               Narrative:    EXAMINATION:  CT HEAD WITH AND WITHOUT    CLINICAL HISTORY:  vision loss, papiledema;    TECHNIQUE:  Axial scans were obtained from skull base to the vertex with and without contrast.    Coronal and sagittal reconstructions obtained from the axial data.    Automatic exposure control was utilized to limit radiation dose.    Radiation Dose:    Total DLP: 1731 mGy*cm    COMPARISON:  None available    FINDINGS:  There is no acute intracranial hemorrhage or edema. The gray-white matter differentiation is preserved. There is no abnormal intracranial enhancement    There is no mass " effect or midline shift. The ventricles and sulci are normal in size. The basal cisterns are patent. There is no abnormal extra-axial fluid collection. The major vessels enhance normally.    The calvarium and skull base are intact. The visualized paranasal sinuses and the mastoid air cells are clear.                                       CT Orbits Sella Post Fossa W Wo Contrast (Final result)  Result time 03/23/24 14:19:57      Final result by Cristina Sherwood MD (03/23/24 14:19:57)                   Impression:      No acute abnormality of the orbits.      Electronically signed by: Cristina Sherwood  Date:    03/23/2024  Time:    14:19               Narrative:    EXAMINATION:  CT ORBITS SELLA POST FOSSA W WO CONTRAST    CLINICAL HISTORY:  Vision loss, papiledema;    TECHNIQUE:  Axial scans were obtained through the orbits with and without contrast.    Coronal and sagittal reconstructions obtained from the axial data.    Automatic exposure control was utilized to limit radiation dose.    Radiation Dose:    Total DLP: 1731 mGy*cm    COMPARISON:  None available    FINDINGS:  The globes are normal in contour.  The optic nerves are intact.  The extraocular muscles are symmetric in size.  There is no postseptal abnormality of the orbits.  The lacrimal gland unremarkable.  Meckel's caves and the cavernous sinuses are normal in appearance.  There is no acute fracture or destructive bone lesion.  The paranasal sinuses are clear.                                       Assessment & Plan:     Acute monocular (right) vision loss  Central vision loss of right eye  Right optic nerve head edema  Right optic nerve scleritis  -ophthalmology consulted and following  -fundus exam per ophthalmology showed:  -Right eye: 20/800, noel APD. Spared SN visual field, complete loss IN, relative loss ST/IT. 3+ ONH edema with tracking fluid and mild heme  -Left eye: unremarkable except <0.1 cup/disc   -differential diagnoses at this time  include demyelinating (atypical optic neuritis), NAION with concern for hypercoagulable state, infectious, inflammatory, mass per ophthalmology  -hypercoagulable workup, syphilis, HIV, NMO/MOG serum abs, IGG4, soluble IL-2 receptor, IL6 pending  -CT head with and without contrast negative for acute abnormality and evidence of demyelination  -CT orbit with and without contrast negative for acute abnormality  -we will plan to obtain MRI when able per ophthalmology  -ophthalmology requesting LP for CSF studies  -we will plan to perform LP when able  -CSF studies ordered per ophthalmology  -patient initiated on IV Solu-Medrol 1000 mg x 3 days per ophthalmology  -if no clinical improvement within 48 hours then we will need to discuss plasmapheresis per ophthalmology    Obesity  Bipolar 2 disorder  Attention deficit hyperactivity disorder  Anxiety  Depression  -patient family contacted outside psychiatrist who requested continuation of psych meds while inpatient unless otherwise indicated  -Motrin 50 mg q.h.s. and sertraline 75 mg every morning    Hyperhidrosis  -asymptomatic  -continue home oxybutynin 15 mg daily  -we will continue to monitor    Code Status:  Full code  GI Access:  P.o.  Feeding:  Adult regular diet  IV Access:  PIV  Fluids:  None  Analgesia:  Acetaminophen 650 mg q.4 hours p.r.n. mild pain  Thromboprophylaxis:  SCDs    Disposition:  Admit to inpatient status for treatment of acute vision loss with management per Ophthalmology.    Ryan Garcia MD  [facility], PGY-1

## 2024-03-24 NOTE — PROGRESS NOTES
Consultation Report  Ophthalmology Service    Date: 03/24/2024    Chief complaint/Reason for Consult: optic nerve head edema, right eye     History of Present Illness: Alma Ca is a 19 y.o. female with complex ocular history as below who presents with 2 weeks of blurred vision of her right eye. Timeline per patient and mother:    August: crusting and redness of right eye, tx with abx drops, did not improve  Sept-Nov: treated by optom for redness, pain that woke patient up. Up to 100mg prednisone a day which helped but could not successfully taper down  Dec-Jan: seen by Brian, started on Brimonidine  Jan-Feb: seen by rheumatology, negative workup, started on MTX 6 pills per week  Feb-March: seen by Jose R, treated with Valtrex with some improvement, off steroids  March ~21st: 2 weeks of blurred vision with sudden onset noticed first thing in the morning also with blue photopsias. No change since. Otherwise symptoms stable.    Interval History: flushing, chest and back pain following solu medrol infusion. Vision stable, no new symptoms.    Family Hx: +psoriatic arthritis (mother) family history includes No Known Problems in her brother, father, mother, and sister.     PMHx:  has a past medical history of ADHD (attention deficit hyperactivity disorder), Anxiety, Bipolar disorder, unspecified, Depression, and Hyperhidrosis.     PSurgHx:  has a past surgical history that includes Tonsillectomy and Adenoidectomy.     Home Medications:   Prior to Admission medications    Medication Sig Start Date End Date Taking? Authorizing Provider   guanfacine HCl (INTUNIV ER ORAL) Intuniv ER Take No date recorded No form recorded No frequency recorded No route recorded No set duration recorded No set duration amount recorded active No dosage strength recorded No dosage strength units of measure recorded    Provider, Historical   lamoTRIgine (LAMICTAL) 25 MG tablet Take 50 mg by mouth every evening. 3/14/23   Provider,  Historical   oxybutynin (DITROPAN XL) 15 MG TR24 Take 15 mg by mouth. 4/17/23   Provider, Historical   sertraline (ZOLOFT) 50 MG tablet Take 75 mg by mouth every morning. 6/1/23   Provider, Historical        Medications this encounter:    enoxparin  40 mg Subcutaneous Daily    lamoTRIgine  50 mg Oral QHS    [START ON 3/25/2024] methylPREDNISolone sodium succinate injection  1,000 mg Intravenous Once    oxybutynin  15 mg Oral Daily    pantoprazole  40 mg Intravenous Daily    sertraline  75 mg Oral QAM       Allergies: has No Known Allergies.     Social:  reports that she has been smoking cigarettes. She has never used smokeless tobacco. She reports that she does not currently use alcohol. She reports that she does not use drugs.     ROS: As per HPI    Ocular examination/Dilated fundus examination:  Base Eye Exam       Visual Acuity (Snellen - Linear)         Right Left    Dist cc  20/20    Near cc 20/100               Tonometry (Tonopen, 11:58 AM)         Right Left    Pressure 20 20              Pupils         Dark Light Shape React APD    Right 6 5 Round Minimal 4+    Left 6 3 Round Brisk None              Visual Fields         Right Left      Full    Restrictions Total inferior temporal, inferior nasal deficiencies; Partial outer superior temporal, superior nasal deficiencies    Dense inf depression. SN relative depression, ST least depressed             Extraocular Movement         Right Left     Full Full                  Slit Lamp and Fundus Exam       External Exam         Right Left    External Normal, normal resistance to retropulsion, no gross exophthalmos Normal              Pen Light Exam         Right Left    Lids/Lashes Normal Normal    Conjunctiva/Sclera 1-2+ inj, worst inf White and quiet    Cornea Clear Clear    Anterior Chamber Deep and quiet Deep and quiet    Iris round, sluggish Round and reactive    Lens Clear Clear    Anterior Vitreous Normal Normal                      Assessment/Plan:     1.  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 Had some improvement of symptoms.  - Initial examination 3/23/24: 2 weeks of blurred vision right eye, sudden onset first noticed in the morning without worse pain than usual. No improvement. Started on 60mg PO pred 3/22/24, sent to ED for evaluation.  -Right eye: 20/800, noel APD. Spared SN visual field, complete loss IN, relative loss ST/IT. 3+ ONH edema with tracking fluid and mild heme. Left eye: unremarkable except <0.1 cup/disc  3/24/24: 20/100 near with eccentric viewing (with great difficulty). Still inf > sup visual field depression, relative ST > SN sparing. Will repeat DFE Monday 3/25/24.  - Labwork: has had extensive infectious and inflammatory workup for scleritis performed previously, requesting records   Negative: HIV, syphilis, homocysteine, fibrinogen, d-dimer, INR, APTT (slightly elevated)  Pending: CSF studies, further hypercoagulable labs, NMO/MOG serum abs, IGG4, soluble IL-2 receptor, IL6  - Imaging:  CT brain/orbit w/wo 3/23/24 unremarkable   Unable to obtain MRI/MRV until Monday 3/25/24  - Differential: demyelinating (atypical optic neuritis), NAION with concern for hypercoagulable state, infectious, inflammatory, mass    Recommendations:  - MRI/MRV brain and orbit w/wo and fat suppression Monday (orders placed)  - LP when available  - Consider 3rd day of solu medrol to try 500mg dose in the AM to see if that improves the reaction, does not sound like true allergy but will defer to medicine for pre-treatment/decision  - If no clinical improvement after 1-2 days and mri shows demyelination, will need to discuss plasmapheresis      Please text/call if there are questions or concerns.  [phone]      Frankie Garcia MD PGY-4  [facility] Resident  03/24/2024  1:02 PM

## 2024-03-24 NOTE — PROGRESS NOTES
[facility] Internal Medicine Wards Progress Note    Resident Team: [facility] Medicine List 3  Attending Physician: Adis Lane MD  Resident: Chelsi Castro MD  Intern: Ryan Garcia MD    Subjective:      History of Present Illness:  Alma Ca is a 19 y.o.  American female with PMH of obesity, bipolar 2 disorder, attention deficit hyperactivity disorder, anxiety, depression, hyperhidrosis who presented to [facility] ED (3/23/2024) due to worsening vision in right eye.  Patient reports that symptoms initially began approximately a month ago (August 2023) with acute onset of right scleral injection and eye pain.  Patient is being followed by Ophthalmology plus Rheumatology and was being treated for active nearest scleritis with varying amounts of oral prednisone and Valtrex with only mild improvement in symptoms temporarily.  Beginning approximately 2 weeks ago patient noted acute onset vision loss to right eye such that she was unable to see objects in her right lateral, right medial, central visual fields.  Patient was seen by Ophthalmology post onset of symptoms at which time she was started on p.o. prednisone 60 mg but when patient did not experience improvement in symptoms times 24 hours she was referred to ED for evaluation.  Ophthalmology evaluated and consulted Internal Medicine for admission.    ED course:  Patient hypertensive but remaining vital signs stable.  CBC unremarkable.  CMP unremarkable.  Urinalysis unremarkable.  CT head with and without contrast unremarkable.  CT head with and without contrast plus CT orbit with and without contrast ophthalmology.    Interval History:   NAEON. Patient reports significant insomnia secondary to high-dose steroids but otherwise has no acute complaints.  States vision is unchanged, continues to endorse vision loss in her right eye with no notable improvement.  Remains with notable conjunctival injection as well as pain.  Denies any new headaches or vision changes  to her other eye.  Opthalmology consulted, appreciate recommendations.  Will continue solumedrol 1g daily x3 days.  Plan for MRI Monday for further workup as well as lumbar puncture.      Past Medical History:  Past Medical History:   Diagnosis Date    ADHD (attention deficit hyperactivity disorder)     Anxiety     Bipolar disorder, unspecified     Depression     Hyperhidrosis      Past Surgical History:  Past Surgical History:   Procedure Laterality Date    ADENOIDECTOMY      TONSILLECTOMY       Family History:  Family History   Problem Relation Age of Onset    No Known Problems Mother     No Known Problems Father     No Known Problems Sister     No Known Problems Brother      Social History:  Social History     Tobacco Use    Smoking status: Some Days     Types: Cigarettes    Smokeless tobacco: Never   Substance Use Topics    Alcohol use: Not Currently    Drug use: Never     Allergies:  Review of patient's allergies indicates:  No Known Allergies  Home Medications:  Prior to Admission medications    Medication Sig Start Date End Date Taking? Authorizing Provider   BIOTIN ORAL Take 200 mg by mouth once daily.   Yes Provider, Historical   ibuprofen (ADVIL,MOTRIN) 200 MG tablet Take 400 mg by mouth 2 (two) times daily as needed for Pain.   Yes Provider, Historical   methotrexate 2.5 MG Tab Take 7.5 mg by mouth every 7 days.   Yes Provider, Historical   NON FORMULARY MEDICATION Take 1 capsule by mouth once daily.   Yes Provider, Historical   predniSONE (DELTASONE) 20 MG tablet Take 60 mg by mouth once daily.   Yes Provider, Historical   valACYclovir (VALTREX) 1000 MG tablet Take 1,000 mg by mouth 2 (two) times daily.   Yes Provider, Historical   guanfacine HCl (INTUNIV ER ORAL) Intuniv ER Take No date recorded No form recorded No frequency recorded No route recorded No set duration recorded No set duration amount recorded active No dosage strength recorded No dosage strength units of measure recorded    Provider,  "Historical   lamoTRIgine (LAMICTAL) 25 MG tablet Take 50 mg by mouth every evening. 3/14/23   Provider, Historical   melatonin (MELATIN) 3 mg tablet Take 5 mg by mouth nightly as needed for Insomnia.    Provider, Historical   NON FORMULARY MEDICATION Take 1 capsule by mouth once daily.    Provider, Historical   oxybutynin (DITROPAN XL) 15 MG TR24 Take 15 mg by mouth. 23   Provider, Historical   sertraline (ZOLOFT) 50 MG tablet Take 75 mg by mouth every morning. 23   Provider, Historical     Review of Systems:  Review of Systems   Constitutional:  Negative for chills, diaphoresis, fatigue and fever.   HENT:  Negative for ear pain, hearing loss, rhinorrhea, sore throat, tinnitus and trouble swallowing.    Eyes:  Positive for pain, redness and visual disturbance.   Respiratory:  Negative for cough, chest tightness and shortness of breath.    Cardiovascular:  Negative for chest pain and palpitations.   Gastrointestinal:  Negative for abdominal pain, constipation, diarrhea, nausea and vomiting.   Genitourinary:  Negative for difficulty urinating, dysuria, frequency, hematuria and urgency.   Musculoskeletal:  Negative for arthralgias and myalgias.   Skin:  Negative for rash and wound.   Neurological:  Negative for dizziness, seizures, syncope, weakness, light-headedness, numbness and headaches.   Psychiatric/Behavioral:  Negative for confusion.         Objective:   Last 24 Hour Vital Signs:  BP  Min: 118/78  Max: 167/96  Temp  Av.2 °F (36.8 °C)  Min: 97.5 °F (36.4 °C)  Max: 98.7 °F (37.1 °C)  Pulse  Av.2  Min: 68  Max: 105  Resp  Av.5  Min: 17  Max: 20  SpO2  Av.8 %  Min: 95 %  Max: 99 %  Height  Av' 8" (172.7 cm)  Min: 5' 8" (172.7 cm)  Max: 5' 8" (172.7 cm)  Weight  Av.2 kg (243 lb 0.9 oz)  Min: 102.1 kg (225 lb 1.4 oz)  Max: 118.4 kg (261 lb 0.4 oz)  Body mass index is 39.69 kg/m².  I/O last 3 completed shifts:  In: 365.3 [P.O.:240; IV Piggyback:125.3]  Out: -     Physical " Examination:  Physical Exam  Vitals reviewed.   Constitutional:       General: She is not in acute distress.     Appearance: Normal appearance. She is obese. She is not ill-appearing, toxic-appearing or diaphoretic.   HENT:      Head: Normocephalic and atraumatic.      Right Ear: External ear normal.      Left Ear: External ear normal.   Eyes:      General: No scleral icterus.        Right eye: No discharge.         Left eye: No discharge.      Extraocular Movements: Extraocular movements intact.      Pupils: Pupils are equal, round, and reactive to light.      Comments: Right sclera injected  Left sclera normal   Cardiovascular:      Rate and Rhythm: Normal rate and regular rhythm.      Pulses: Normal pulses.      Heart sounds: Normal heart sounds. No murmur heard.     No friction rub. No gallop.   Pulmonary:      Effort: Pulmonary effort is normal. No respiratory distress.      Breath sounds: Normal breath sounds. No wheezing, rhonchi or rales.   Abdominal:      General: Abdomen is flat. Bowel sounds are normal. There is no distension.      Palpations: Abdomen is soft.      Tenderness: There is no abdominal tenderness. There is no guarding.   Musculoskeletal:         General: No swelling, tenderness, deformity or signs of injury. Normal range of motion.      Right lower leg: No edema.      Left lower leg: No edema.   Skin:     General: Skin is warm and dry.      Capillary Refill: Capillary refill takes less than 2 seconds.      Coloration: Skin is not jaundiced.      Findings: No bruising, erythema or rash.   Neurological:      Mental Status: She is alert.      Comments: AAO to person, place, time, and situation; CN II-XII grossly intact         Laboratory:  Most Recent Data:  CBC:   Lab Results   Component Value Date    WBC 18.93 (H) 03/24/2024    HGB 12.5 03/24/2024    HCT 39.1 03/24/2024     03/24/2024    MCV 83.0 03/24/2024    RDW 16.7 03/24/2024     WBC Differential:   Recent Labs   Lab 03/23/24  1252  "03/24/24  0329   WBC 10.96 18.93*   HGB 13.4 12.5   HCT 41.4 39.1    381   MCV 82.8 83.0       BMP:   Lab Results   Component Value Date     03/24/2024    K 3.7 03/24/2024    CO2 22 03/24/2024    BUN 12.8 03/24/2024    CREATININE 0.79 03/24/2024    CALCIUM 10.7 (H) 03/24/2024    MG 1.70 03/24/2024    PHOS 2.6 03/24/2024     LFTs:   Lab Results   Component Value Date    ALBUMIN 4.0 03/24/2024    BILITOT 0.3 03/24/2024    AST 15 03/24/2024    ALKPHOS 62 03/24/2024    ALT 17 03/24/2024     Coags:   Lab Results   Component Value Date    INR 1.0 03/23/2024    PROTIME 13.3 03/23/2024    PTT 34.4 (H) 03/23/2024     FLP:   Lab Results   Component Value Date    CHOL 178 (H) 10/03/2023    HDL 44 10/03/2023    LDLCALC 121 (H) 10/03/2023    TRIG 69 10/03/2023     DM:   Lab Results   Component Value Date    HGBA1C 5.1 03/23/2024    LDLCALC 121 (H) 10/03/2023    CREATININE 0.79 03/24/2024     Thyroid:   Lab Results   Component Value Date    TSH 0.586 10/03/2023     Anemia: No results found for: "IRON", "TIBC", "FERRITIN", "[id number]", "FOLATE"  Cardiac: No results found for: "TROPONINI", "CKTOTAL", "CKMB", "BNP"  Urinalysis:   Lab Results   Component Value Date    PHUA 5.5 03/23/2024    UROBILINOGEN Normal 03/23/2024    WBCUA 0-5 03/23/2024     Trended Lab Data:  Recent Labs   Lab 03/23/24  1252 03/24/24 0329   WBC 10.96 18.93*   HGB 13.4 12.5   HCT 41.4 39.1    381   MCV 82.8 83.0   RDW 17.2* 16.7    138   K 4.3 3.7   CO2 22 22   BUN 13.8 12.8   CREATININE 0.90 0.79   ALBUMIN 4.3 4.0   BILITOT 0.4 0.3   AST 19 15   ALKPHOS 71 62   ALT 18 17       Trended Cardiac Data:  No results for input(s): "TROPONINI", "CKTOTAL", "CKMB", "BNP" in the last 168 hours.  Radiology:  Imaging Results              CT Head W Wo Contrast (Final result)  Result time 03/23/24 14:17:13      Final result by Cristina Sherwood MD (03/23/24 14:17:13)                   Impression:      No acute intracranial " abnormality.      Electronically signed by: Cristina Sherwood  Date:    03/23/2024  Time:    14:17               Narrative:    EXAMINATION:  CT HEAD WITH AND WITHOUT    CLINICAL HISTORY:  vision loss, papiledema;    TECHNIQUE:  Axial scans were obtained from skull base to the vertex with and without contrast.    Coronal and sagittal reconstructions obtained from the axial data.    Automatic exposure control was utilized to limit radiation dose.    Radiation Dose:    Total DLP: 1731 mGy*cm    COMPARISON:  None available    FINDINGS:  There is no acute intracranial hemorrhage or edema. The gray-white matter differentiation is preserved. There is no abnormal intracranial enhancement    There is no mass effect or midline shift. The ventricles and sulci are normal in size. The basal cisterns are patent. There is no abnormal extra-axial fluid collection. The major vessels enhance normally.    The calvarium and skull base are intact. The visualized paranasal sinuses and the mastoid air cells are clear.                                       CT Orbits Sella Post Fossa W Wo Contrast (Final result)  Result time 03/23/24 14:19:57      Final result by Cristina Sherwood MD (03/23/24 14:19:57)                   Impression:      No acute abnormality of the orbits.      Electronically signed by: Cristina Sherwood  Date:    03/23/2024  Time:    14:19               Narrative:    EXAMINATION:  CT ORBITS SELLA POST FOSSA W WO CONTRAST    CLINICAL HISTORY:  Vision loss, papiledema;    TECHNIQUE:  Axial scans were obtained through the orbits with and without contrast.    Coronal and sagittal reconstructions obtained from the axial data.    Automatic exposure control was utilized to limit radiation dose.    Radiation Dose:    Total DLP: 1731 mGy*cm    COMPARISON:  None available    FINDINGS:  The globes are normal in contour.  The optic nerves are intact.  The extraocular muscles are symmetric in size.  There is no postseptal abnormality  of the orbits.  The lacrimal gland unremarkable.  Meckel's caves and the cavernous sinuses are normal in appearance.  There is no acute fracture or destructive bone lesion.  The paranasal sinuses are clear.                                       Assessment & Plan:     Acute monocular (right) vision loss  Central vision loss of right eye  Right optic nerve head edema  Right optic nerve scleritis  -ophthalmology consulted and following  -fundus exam per ophthalmology showed:  -Right eye: 20/800, noel APD. Spared SN visual field, complete loss IN, relative loss ST/IT. 3+ ONH edema with tracking fluid and mild heme  -Left eye: unremarkable except <0.1 cup/disc   -differential diagnoses at this time include demyelinating (atypical optic neuritis), NAION with concern for hypercoagulable state, infectious, inflammatory, mass per ophthalmology  -HIV ad syphilis negative.   -hypercoagulable workup, NMO/MOG serum abs, IGG4, soluble IL-2 receptor, IL6 pending  -CT head with and without contrast negative for acute abnormality and evidence of demyelination  -CT orbit with and without contrast negative for acute abnormality  -we will plan to obtain MRI when able per ophthalmology  -ophthalmology requesting LP for CSF studies.   -we will plan to perform LP when able  -CSF studies ordered per ophthalmology  -patient initiated on IV Solu-Medrol 1000 mg x 3 days per ophthalmology  -if no clinical improvement within 48 hours then we will need to discuss plasmapheresis per ophthalmology    Obesity  Bipolar 2 disorder  Attention deficit hyperactivity disorder  Anxiety  Depression  -patient family contacted outside psychiatrist who requested continuation of psych meds while inpatient unless otherwise indicated  -Motrin 50 mg q.h.s. and sertraline 75 mg every morning    Hyperhidrosis  -asymptomatic  -continue home oxybutynin 15 mg daily  -we will continue to monitor    Code Status:  Full code  GI Access:  P.o.  Feeding:  Adult regular  diet  IV Access:  PIV  Fluids:  None  Analgesia:  Acetaminophen 650 mg q.4 hours p.r.n. mild pain  Thromboprophylaxis:  SCDs    Disposition:  Admit to inpatient status for treatment of acute vision loss with management per Ophthalmology.    Chelsi Castro MD  [facility], PGY-2

## 2024-03-24 NOTE — NURSING
Patient call and complained of tightness in the ribs and spine. Dr. James notified and came to evaluate patient.

## 2024-03-24 NOTE — PLAN OF CARE
03/24/24 1037   Discharge Assessment   Assessment Type Discharge Planning Assessment   Confirmed/corrected address, phone number and insurance Yes   Confirmed Demographics Correct on Facesheet   Source of Information patient;family   When was your last doctors appointment?   (PCP: Jayda Drew)   Does patient/caregiver understand observation status Yes   Communicated MANDEEP with patient/caregiver Date not available/Unable to determine   Reason For Admission H47.10 Optic disc edema  H54.61 Vision loss of right eye  R07.9 Chest pain  H47.10 Optic nerve edema   People in Home sibling(s);parent(s)   Facility Arrived From: Home   Do you expect to return to your current living situation? Yes   Do you have help at home or someone to help you manage your care at home? Yes   Who are your caregiver(s) and their phone number(s)? Caitlyn Ca, mother, P: [phone]   Prior to hospitilization cognitive status: Alert/Oriented;No Deficits   Current cognitive status: Alert/Oriented;No Deficits   Walking or Climbing Stairs Difficulty no   Dressing/Bathing Difficulty no   Home Accessibility wheelchair accessible;stairs within home   Number of Stairs, Within Home, Primary other (see comments)  (20)   Home Layout Bathroom on 2nd floor;Bedroom on 2nd floor   Equipment Currently Used at Home none   Readmission within 30 days? No   Patient currently being followed by outpatient case management? No   Do you currently have service(s) that help you manage your care at home? No   Do you take prescription medications? Yes  (24 hour [facility] on [address])   Do you have prescription coverage? Yes   Coverage BCBS   Do you have any problems affording any of your prescribed medications? No   Is the patient taking medications as prescribed? yes   Who is going to help you get home at discharge? Family   How do you get to doctors appointments? car, drives self;family or friend will provide   Are you on dialysis? No   Do you  take coumadin? No   Discharge Plan A Home with family   DME Needed Upon Discharge  none   Discharge Plan discussed with: Parent(s);Patient   Name(s) and Number(s) Caitlyn Ca, mother, P: [phone]   Transition of Care Barriers None   Physical Activity   On average, how many days per week do you engage in moderate to strenuous exercise (like a brisk walk)? 5 days   On average, how many minutes do you engage in exercise at this level? 30 min   Financial Resource Strain   How hard is it for you to pay for the very basics like food, housing, medical care, and heating? Not hard   Housing Stability   In the last 12 months, was there a time when you were not able to pay the mortgage or rent on time? N   In the last 12 months, how many places have you lived? 1   In the last 12 months, was there a time when you did not have a steady place to sleep or slept in a shelter (including now)? N   Transportation Needs   In the past 12 months, has lack of transportation kept you from medical appointments or from getting medications? no   In the past 12 months, has lack of transportation kept you from meetings, work, or from getting things needed for daily living? No   Food Insecurity   Within the past 12 months, you worried that your food would run out before you got the money to buy more. Never true   Within the past 12 months, the food you bought just didn't last and you didn't have money to get more. Never true   Stress   Do you feel stress - tense, restless, nervous, or anxious, or unable to sleep at night because your mind is troubled all the time - these days? Only a littl   Social Connections   In a typical week, how many times do you talk on the phone with family, friends, or neighbors? More than 3   How often do you get together with friends or relatives? More than 3   How often do you attend Roman Catholic or Catholic services? 1 to 4   Do you belong to any clubs or organizations such as Roman Catholic groups, unions, fraternal or  athletic groups, or school groups? No   How often do you attend meetings of the clubs or organizations you belong to? Never   Are you , , , , never , or living with a partner? Never marrie   Alcohol Use   Q1: How often do you have a drink containing alcohol? 2-4 pr month   Q2: How many drinks containing alcohol do you have on a typical day when you are drinking? 1 or 2   Q3: How often do you have six or more drinks on one occasion? 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

## 2024-03-24 NOTE — CARE UPDATE
Received a call from nursing staff reporting patient was endorsing new symptoms including twitching, abdominal fullness, and flushing after completion of IV solumedrol.  On evaluation of patient she appeared to have significant flushing of her face and neck and was endorsing abdominal fullness with pain in her epigastric region.  She states her breathing felt shallow but denies any notable swelling in throat or difficulty with breathing. Vitals stable. Given benadryl 50mg prior to arrival.  Will also give Protonix IV and start 1L LR for IVF resuscitation. Will start patient on tele monitoring and continue to monitor closely for acute decompensation. Suspect this is likely an allergic reaction to solumedrol.  Will take off of med list for now and discuss with opthalmology regarding continued treatment.

## 2024-03-24 NOTE — NURSING
Patient complain of twitching and flush in face after iv steroid. Benadryl given. Dr. James notified

## 2024-03-25 LAB
ABS NEUT CALC (OHS): 21.29 X10(3)/MCL (ref 2.1–9.2)
ALBUMIN SERPL-MCNC: 3.6 G/DL (ref 3.5–5)
ALBUMIN/GLOB SERPL: 1 RATIO (ref 1.1–2)
ALP SERPL-CCNC: 63 UNIT/L (ref 40–150)
ALT SERPL-CCNC: 15 UNIT/L (ref 0–55)
ANISOCYTOSIS BLD QL SMEAR: ABNORMAL
AST SERPL-CCNC: 14 UNIT/L (ref 5–34)
BILIRUB SERPL-MCNC: 0.2 MG/DL
BUN SERPL-MCNC: 15.4 MG/DL (ref 7–18.7)
CALCIUM SERPL-MCNC: 10.1 MG/DL (ref 8.4–10.2)
CHLORIDE SERPL-SCNC: 110 MMOL/L (ref 98–107)
CO2 SERPL-SCNC: 22 MMOL/L (ref 22–29)
CREAT SERPL-MCNC: 0.74 MG/DL (ref 0.55–1.02)
ERYTHROCYTE [DISTWIDTH] IN BLOOD BY AUTOMATED COUNT: 17.2 % (ref 11.5–17)
GFR SERPLBLD CREATININE-BSD FMLA CKD-EPI: >60 MLS/MIN/1.73/M2
GLOBULIN SER-MCNC: 3.5 GM/DL (ref 2.4–3.5)
GLUCOSE SERPL-MCNC: 112 MG/DL (ref 74–100)
HCT VFR BLD AUTO: 36 % (ref 37–47)
HGB BLD-MCNC: 11.4 G/DL (ref 12–16)
LYMPHOCYTES NFR BLD MANUAL: 2.23 X10(3)/MCL
LYMPHOCYTES NFR BLD MANUAL: 9 % (ref 13–40)
MACROCYTES BLD QL SMEAR: SLIGHT
MAGNESIUM SERPL-MCNC: 2 MG/DL (ref 1.6–2.6)
MCH RBC QN AUTO: 25.9 PG (ref 27–31)
MCHC RBC AUTO-ENTMCNC: 31.7 G/DL (ref 33–36)
MCV RBC AUTO: 81.6 FL (ref 80–94)
MICROCYTES BLD QL SMEAR: SLIGHT
MONOCYTES NFR BLD MANUAL: 1.24 X10(3)/MCL (ref 0.1–1.3)
MONOCYTES NFR BLD MANUAL: 5 % (ref 2–11)
NEUTROPHILS NFR BLD MANUAL: 86 % (ref 47–80)
NRBC BLD AUTO-RTO: 0 %
OVALOCYTES (OLG): SLIGHT
PHOSPHATE SERPL-MCNC: 3.4 MG/DL (ref 2.3–4.7)
PLATELET # BLD AUTO: 374 X10(3)/MCL (ref 130–400)
PLATELET # BLD EST: ADEQUATE 10*3/UL
PMV BLD AUTO: 10 FL (ref 7.4–10.4)
POCT GLUCOSE: 117 MG/DL (ref 70–110)
POCT GLUCOSE: 149 MG/DL (ref 70–110)
POCT GLUCOSE: 79 MG/DL (ref 70–110)
POCT GLUCOSE: 81 MG/DL (ref 70–110)
POIKILOCYTOSIS BLD QL SMEAR: SLIGHT
POTASSIUM SERPL-SCNC: 3.8 MMOL/L (ref 3.5–5.1)
PROT SERPL-MCNC: 7.1 GM/DL (ref 6.4–8.3)
RBC # BLD AUTO: 4.41 X10(6)/MCL (ref 4.2–5.4)
RBC MORPH BLD: ABNORMAL
SODIUM SERPL-SCNC: 141 MMOL/L (ref 136–145)
WBC # SPEC AUTO: 24.76 X10(3)/MCL (ref 4.5–11.5)

## 2024-03-25 PROCEDURE — 63600175 PHARM REV CODE 636 W HCPCS

## 2024-03-25 PROCEDURE — 80053 COMPREHEN METABOLIC PANEL: CPT

## 2024-03-25 PROCEDURE — 83735 ASSAY OF MAGNESIUM: CPT

## 2024-03-25 PROCEDURE — 00JU3ZZ INSPECTION OF SPINAL CANAL, PERCUTANEOUS APPROACH: ICD-10-PCS | Performed by: INTERNAL MEDICINE

## 2024-03-25 PROCEDURE — A9577 INJ MULTIHANCE: HCPCS

## 2024-03-25 PROCEDURE — 21400001 HC TELEMETRY ROOM

## 2024-03-25 PROCEDURE — C9113 INJ PANTOPRAZOLE SODIUM, VIA: HCPCS

## 2024-03-25 PROCEDURE — 25000003 PHARM REV CODE 250

## 2024-03-25 PROCEDURE — 85007 BL SMEAR W/DIFF WBC COUNT: CPT

## 2024-03-25 PROCEDURE — 25500020 PHARM REV CODE 255

## 2024-03-25 PROCEDURE — 84100 ASSAY OF PHOSPHORUS: CPT

## 2024-03-25 RX ORDER — DIPHENHYDRAMINE HCL 25 MG
25 CAPSULE ORAL ONCE
Status: COMPLETED | OUTPATIENT
Start: 2024-03-25 | End: 2024-03-25

## 2024-03-25 RX ORDER — HYDROMORPHONE HYDROCHLORIDE 1 MG/ML
0.5 INJECTION, SOLUTION INTRAMUSCULAR; INTRAVENOUS; SUBCUTANEOUS ONCE
Status: COMPLETED | OUTPATIENT
Start: 2024-03-25 | End: 2024-03-25

## 2024-03-25 RX ORDER — MORPHINE SULFATE 2 MG/ML
2 INJECTION, SOLUTION INTRAMUSCULAR; INTRAVENOUS EVERY 4 HOURS PRN
Status: DISCONTINUED | OUTPATIENT
Start: 2024-03-25 | End: 2024-03-26 | Stop reason: HOSPADM

## 2024-03-25 RX ADMIN — HYDROMORPHONE HYDROCHLORIDE 0.5 MG: 1 INJECTION, SOLUTION INTRAMUSCULAR; INTRAVENOUS; SUBCUTANEOUS at 03:03

## 2024-03-25 RX ADMIN — ENOXAPARIN SODIUM 40 MG: 40 INJECTION SUBCUTANEOUS at 04:03

## 2024-03-25 RX ADMIN — DEXTROSE MONOHYDRATE 500 MG: 50 INJECTION, SOLUTION INTRAVENOUS at 07:03

## 2024-03-25 RX ADMIN — SERTRALINE HYDROCHLORIDE 75 MG: 50 TABLET ORAL at 07:03

## 2024-03-25 RX ADMIN — DIPHENHYDRAMINE HYDROCHLORIDE 25 MG: 25 CAPSULE ORAL at 06:03

## 2024-03-25 RX ADMIN — LORAZEPAM 1 MG: 1 TABLET ORAL at 09:03

## 2024-03-25 RX ADMIN — LAMOTRIGINE 50 MG: 25 TABLET ORAL at 09:03

## 2024-03-25 RX ADMIN — OXYBUTYNIN CHLORIDE 15 MG: 5 TABLET, EXTENDED RELEASE ORAL at 08:03

## 2024-03-25 RX ADMIN — GADOBENATE DIMEGLUMINE 20 ML: 529 INJECTION, SOLUTION INTRAVENOUS at 12:03

## 2024-03-25 RX ADMIN — PANTOPRAZOLE SODIUM 40 MG: 40 INJECTION, POWDER, FOR SOLUTION INTRAVENOUS at 08:03

## 2024-03-25 RX ADMIN — MORPHINE SULFATE 2 MG: 2 INJECTION, SOLUTION INTRAMUSCULAR; INTRAVENOUS at 04:03

## 2024-03-25 NOTE — PROGRESS NOTES
[facility] Internal Medicine Wards Progress Note    Resident Team: [facility] Medicine List 3  Attending Physician: Adis Lane MD  Resident: Chelsi Castro MD  Intern: Ryan Garcia MD    Subjective:      History of Present Illness:  Alma Ca is a 19 y.o.  American female with PMH of obesity, bipolar 2 disorder, attention deficit hyperactivity disorder, anxiety, depression, hyperhidrosis who presented to [facility] ED (3/23/2024) due to worsening vision in right eye.  Patient reports that symptoms initially began approximately a month ago (August 2023) with acute onset of right scleral injection and eye pain.  Patient is being followed by Ophthalmology plus Rheumatology and was being treated for active nearest scleritis with varying amounts of oral prednisone and Valtrex with only mild improvement in symptoms temporarily.  Beginning approximately 2 weeks ago patient noted acute onset vision loss to right eye such that she was unable to see objects in her right lateral, right medial, central visual fields.  Patient was seen by Ophthalmology post onset of symptoms at which time she was started on p.o. prednisone 60 mg but when patient did not experience improvement in symptoms times 24 hours she was referred to ED for evaluation.  Ophthalmology evaluated and consulted Internal Medicine for admission.    ED course:  Patient hypertensive but remaining vital signs stable.  CBC unremarkable.  CMP unremarkable.  Urinalysis unremarkable.  CT head with and without contrast unremarkable.  CT head with and without contrast plus CT orbit with and without contrast ophthalmology.    Interval History:   NAEON. Patient reports significant insomnia secondary to high-dose steroids but otherwise has no acute complaints.  States vision is unchanged as he continues to endorse vision loss in her right eye with no notable improvement. Redness and pain to right signficantly improved. Denies any new headaches or vision changes to her  other eye.  Opthalmology consulted, appreciate recommendations.  Will continue solumedrol 1g daily x3 days.  Plan for MRI today. Will plan to perform LP when able.     Past Medical History:  Past Medical History:   Diagnosis Date    ADHD (attention deficit hyperactivity disorder)     Anxiety     Bipolar disorder, unspecified     Depression     Hyperhidrosis      Past Surgical History:  Past Surgical History:   Procedure Laterality Date    ADENOIDECTOMY      TONSILLECTOMY       Family History:  Family History   Problem Relation Age of Onset    No Known Problems Mother     No Known Problems Father     No Known Problems Sister     No Known Problems Brother      Social History:  Social History     Tobacco Use    Smoking status: Some Days     Types: Cigarettes    Smokeless tobacco: Never   Substance Use Topics    Alcohol use: Not Currently    Drug use: Never     Allergies:  Review of patient's allergies indicates:  No Known Allergies  Home Medications:  Prior to Admission medications    Medication Sig Start Date End Date Taking? Authorizing Provider   BIOTIN ORAL Take 200 mg by mouth once daily.   Yes Provider, Historical   ibuprofen (ADVIL,MOTRIN) 200 MG tablet Take 400 mg by mouth 2 (two) times daily as needed for Pain.   Yes Provider, Historical   methotrexate 2.5 MG Tab Take 7.5 mg by mouth every 7 days.   Yes Provider, Historical   NON FORMULARY MEDICATION Take 1 capsule by mouth once daily.   Yes Provider, Historical   predniSONE (DELTASONE) 20 MG tablet Take 60 mg by mouth once daily.   Yes Provider, Historical   valACYclovir (VALTREX) 1000 MG tablet Take 1,000 mg by mouth 2 (two) times daily.   Yes Provider, Historical   guanfacine HCl (INTUNIV ER ORAL) Intuniv ER Take No date recorded No form recorded No frequency recorded No route recorded No set duration recorded No set duration amount recorded active No dosage strength recorded No dosage strength units of measure recorded    Provider, Historical   lamoTRIgine  (LAMICTAL) 25 MG tablet Take 50 mg by mouth every evening. 3/14/23   Provider, Historical   melatonin (MELATIN) 3 mg tablet Take 5 mg by mouth nightly as needed for Insomnia.    Provider, Historical   NON FORMULARY MEDICATION Take 1 capsule by mouth once daily.    Provider, Historical   oxybutynin (DITROPAN XL) 15 MG TR24 Take 15 mg by mouth. 23   Provider, Historical   sertraline (ZOLOFT) 50 MG tablet Take 75 mg by mouth every morning. 23   Provider, Historical     Review of Systems:  Review of Systems   Constitutional:  Negative for chills, diaphoresis, fatigue and fever.   HENT:  Negative for ear pain, hearing loss, rhinorrhea, sore throat, tinnitus and trouble swallowing.    Eyes:  Positive for visual disturbance. Negative for pain and redness.   Respiratory:  Negative for cough, chest tightness and shortness of breath.    Cardiovascular:  Negative for chest pain and palpitations.   Gastrointestinal:  Negative for abdominal pain, constipation, diarrhea, nausea and vomiting.   Genitourinary:  Negative for difficulty urinating, dysuria, frequency, hematuria and urgency.   Musculoskeletal:  Negative for arthralgias and myalgias.   Skin:  Negative for rash and wound.   Neurological:  Negative for dizziness, seizures, syncope, weakness, light-headedness, numbness and headaches.   Psychiatric/Behavioral:  Negative for confusion.         Objective:   Last 24 Hour Vital Signs:  BP  Min: 118/72  Max: 137/61  Temp  Av.1 °F (36.7 °C)  Min: 97.7 °F (36.5 °C)  Max: 98.7 °F (37.1 °C)  Pulse  Av.4  Min: 62  Max: 91  Resp  Av  Min: 18  Max: 18  SpO2  Av.3 %  Min: 95 %  Max: 100 %  Body mass index is 39.69 kg/m².  I/O last 3 completed shifts:  In: 420 [P.O.:420]  Out: -     Physical Examination:  Physical Exam  Vitals reviewed.   Constitutional:       General: She is not in acute distress.     Appearance: Normal appearance. She is obese. She is not ill-appearing, toxic-appearing or diaphoretic.    HENT:      Head: Normocephalic and atraumatic.      Right Ear: External ear normal.      Left Ear: External ear normal.   Eyes:      General: No scleral icterus.        Right eye: No discharge.         Left eye: No discharge.      Extraocular Movements: Extraocular movements intact.      Pupils: Pupils are equal, round, and reactive to light.      Comments: Right sclera injected  Left sclera normal    See optho note for detailed visual field exam     Cardiovascular:      Rate and Rhythm: Normal rate and regular rhythm.      Pulses: Normal pulses.      Heart sounds: Normal heart sounds. No murmur heard.     No friction rub. No gallop.   Pulmonary:      Effort: Pulmonary effort is normal. No respiratory distress.      Breath sounds: Normal breath sounds. No wheezing, rhonchi or rales.   Abdominal:      General: Abdomen is flat. Bowel sounds are normal. There is no distension.      Palpations: Abdomen is soft.      Tenderness: There is no abdominal tenderness. There is no guarding.   Musculoskeletal:         General: No swelling, tenderness, deformity or signs of injury. Normal range of motion.      Right lower leg: No edema.      Left lower leg: No edema.   Skin:     General: Skin is warm and dry.      Capillary Refill: Capillary refill takes less than 2 seconds.      Coloration: Skin is not jaundiced.      Findings: No bruising, erythema or rash.   Neurological:      Mental Status: She is alert.      Comments: AAO to person, place, time, and situation; CN II-XII grossly intact         Laboratory:  Most Recent Data:  CBC:   Lab Results   Component Value Date    WBC 24.76 (H) 03/25/2024    HGB 11.4 (L) 03/25/2024    HCT 36.0 (L) 03/25/2024     03/25/2024    MCV 81.6 03/25/2024    RDW 17.2 (H) 03/25/2024     WBC Differential:   Recent Labs   Lab 03/23/24  1252 03/24/24  0329 03/25/24  0350   WBC 10.96 18.93* 24.76*   HGB 13.4 12.5 11.4*   HCT 41.4 39.1 36.0*    381 374   MCV 82.8 83.0 81.6       BMP:  "  Lab Results   Component Value Date     03/25/2024    K 3.8 03/25/2024    CO2 22 03/25/2024    BUN 15.4 03/25/2024    CREATININE 0.74 03/25/2024    CALCIUM 10.1 03/25/2024    MG 2.00 03/25/2024    PHOS 3.4 03/25/2024     LFTs:   Lab Results   Component Value Date    ALBUMIN 3.6 03/25/2024    BILITOT 0.2 03/25/2024    AST 14 03/25/2024    ALKPHOS 63 03/25/2024    ALT 15 03/25/2024     Coags:   Lab Results   Component Value Date    INR 1.0 03/23/2024    PROTIME 13.3 03/23/2024    PTT 34.4 (H) 03/23/2024     FLP:   Lab Results   Component Value Date    CHOL 178 (H) 10/03/2023    HDL 44 10/03/2023    LDLCALC 121 (H) 10/03/2023    TRIG 69 10/03/2023     DM:   Lab Results   Component Value Date    HGBA1C 5.1 03/23/2024    LDLCALC 121 (H) 10/03/2023    CREATININE 0.74 03/25/2024     Thyroid:   Lab Results   Component Value Date    TSH 0.586 10/03/2023     Anemia: No results found for: "IRON", "TIBC", "FERRITIN", "VNPAGXJJ62", "FOLATE"  Cardiac: No results found for: "TROPONINI", "CKTOTAL", "CKMB", "BNP"  Urinalysis:   Lab Results   Component Value Date    PHUA 5.5 03/23/2024    UROBILINOGEN Normal 03/23/2024    WBCUA 0-5 03/23/2024     Trended Lab Data:  Recent Labs   Lab 03/23/24  1252 03/24/24  0329 03/25/24  0350 03/25/24  0351   WBC 10.96 18.93* 24.76*  --    HGB 13.4 12.5 11.4*  --    HCT 41.4 39.1 36.0*  --     381 374  --    MCV 82.8 83.0 81.6  --    RDW 17.2* 16.7 17.2*  --     138  --  141   K 4.3 3.7  --  3.8   CO2 22 22  --  22   BUN 13.8 12.8  --  15.4   CREATININE 0.90 0.79  --  0.74   ALBUMIN 4.3 4.0  --  3.6   BILITOT 0.4 0.3  --  0.2   AST 19 15  --  14   ALKPHOS 71 62  --  63   ALT 18 17  --  15       Trended Cardiac Data:  No results for input(s): "TROPONINI", "CKTOTAL", "CKMB", "BNP" in the last 168 hours.  Radiology:  Imaging Results              CT Head W Wo Contrast (Final result)  Result time 03/23/24 14:17:13      Final result by Cristina Sherwood MD (03/23/24 14:17:13)        "            Impression:      No acute intracranial abnormality.      Electronically signed by: Cristina Sherwood  Date:    03/23/2024  Time:    14:17               Narrative:    EXAMINATION:  CT HEAD WITH AND WITHOUT    CLINICAL HISTORY:  vision loss, papiledema;    TECHNIQUE:  Axial scans were obtained from skull base to the vertex with and without contrast.    Coronal and sagittal reconstructions obtained from the axial data.    Automatic exposure control was utilized to limit radiation dose.    Radiation Dose:    Total DLP: 1731 mGy*cm    COMPARISON:  None available    FINDINGS:  There is no acute intracranial hemorrhage or edema. The gray-white matter differentiation is preserved. There is no abnormal intracranial enhancement    There is no mass effect or midline shift. The ventricles and sulci are normal in size. The basal cisterns are patent. There is no abnormal extra-axial fluid collection. The major vessels enhance normally.    The calvarium and skull base are intact. The visualized paranasal sinuses and the mastoid air cells are clear.                                       CT Orbits Sella Post Fossa W Wo Contrast (Final result)  Result time 03/23/24 14:19:57      Final result by Cristina Sherwood MD (03/23/24 14:19:57)                   Impression:      No acute abnormality of the orbits.      Electronically signed by: Cristina Sherwood  Date:    03/23/2024  Time:    14:19               Narrative:    EXAMINATION:  CT ORBITS SELLA POST FOSSA W WO CONTRAST    CLINICAL HISTORY:  Vision loss, papiledema;    TECHNIQUE:  Axial scans were obtained through the orbits with and without contrast.    Coronal and sagittal reconstructions obtained from the axial data.    Automatic exposure control was utilized to limit radiation dose.    Radiation Dose:    Total DLP: 1731 mGy*cm    COMPARISON:  None available    FINDINGS:  The globes are normal in contour.  The optic nerves are intact.  The extraocular muscles are  symmetric in size.  There is no postseptal abnormality of the orbits.  The lacrimal gland unremarkable.  Meckel's caves and the cavernous sinuses are normal in appearance.  There is no acute fracture or destructive bone lesion.  The paranasal sinuses are clear.                                       Assessment & Plan:     Acute monocular (right) vision loss  Central vision loss of right eye  Right optic nerve head edema  Right optic nerve scleritis  -ophthalmology consulted and following  -fundus exam per ophthalmology showed:  -Right eye: 20/800, noel APD. Spared SN visual field, complete loss IN, relative loss ST/IT. 3+ ONH edema with tracking fluid and mild heme  -Left eye: unremarkable except <0.1 cup/disc   -differential diagnoses at this time include demyelinating (atypical optic neuritis), NAION with concern for hypercoagulable state, infectious, inflammatory, mass per ophthalmology  -HIV and syphilis negative.   -hypercoagulable workup, NMO/MOG serum abs, IGG4, soluble IL-2 receptor, IL6 pending  -CT head with and without contrast negative for acute abnormality and evidence of demyelination  -CT orbit with and without contrast negative for acute abnormality  -will obtain MRI brain with and without contrast   -will obtain MRV brain with and without contrast  -will obtain MRI orbits with and without contrast  -ophthalmology requesting LP for CSF studies  -we will plan to perform LP when able  -CSF studies ordered per ophthalmology  -patient initiated on IV Solu-Medrol 1000 mg x 3 days per ophthalmology  -if no clinical improvement within 48 hours then we will need to discuss plasmapheresis per ophthalmology    Obesity  Bipolar 2 disorder  Attention deficit hyperactivity disorder  Anxiety  Depression  -patient family contacted outside psychiatrist who requested continuation of psych meds while inpatient unless otherwise indicated  -continue guanfacine ER 2 mg qhs, Motrin 50 mg q.h.s. and sertraline 75 mg every  morning    Hyperhidrosis  -asymptomatic  -continue home oxybutynin 15 mg daily  -we will continue to monitor    Code Status:  Full code  GI Access:  P.o.  Feeding:  Adult regular diet  IV Access:  PIV  Fluids:  None  Analgesia:  Acetaminophen 650 mg q.4 hours p.r.n. mild pain  Thromboprophylaxis:  SCDs    Disposition: Continue inpatient status for treatment of acute vision loss with management per Ophthalmology.    Ryan Garcia MD  [facility], PGY-2

## 2024-03-25 NOTE — NURSING
Spoke with Dr. Castro about patient having an allergic reaction to Solumedrol received last night. Patient expressed concern about receiving dose prior to MRI scan. Spoke with MRI tech that stated around 9:30AM they will attempt to scan patient. Dr. Castro okay with holding off on Benadryl and Solumedrol dose.

## 2024-03-26 VITALS
WEIGHT: 261 LBS | HEART RATE: 94 BPM | BODY MASS INDEX: 39.56 KG/M2 | OXYGEN SATURATION: 98 % | HEIGHT: 68 IN | DIASTOLIC BLOOD PRESSURE: 82 MMHG | SYSTOLIC BLOOD PRESSURE: 135 MMHG | RESPIRATION RATE: 18 BRPM | TEMPERATURE: 98 F

## 2024-03-26 DIAGNOSIS — H46.9 OPTIC NEURITIS: Primary | ICD-10-CM

## 2024-03-26 PROBLEM — H47.10 OPTIC DISC EDEMA: Status: ACTIVE | Noted: 2024-03-26

## 2024-03-26 PROBLEM — H54.61 VISION LOSS OF RIGHT EYE: Status: ACTIVE | Noted: 2024-03-26

## 2024-03-26 LAB
ALBUMIN SERPL-MCNC: 3.6 G/DL (ref 3.5–5)
ALBUMIN/GLOB SERPL: 1 RATIO (ref 1.1–2)
ALP SERPL-CCNC: 57 UNIT/L (ref 40–150)
ALT SERPL-CCNC: 15 UNIT/L (ref 0–55)
APPEARANCE CSF: CLEAR
AST SERPL-CCNC: 12 UNIT/L (ref 5–34)
AT III ACT/NOR PPP CHRO: 98 % (ref 80–130)
BASOPHILS # BLD AUTO: 0.01 X10(3)/MCL
BASOPHILS NFR BLD AUTO: 0.1 %
BILIRUB SERPL-MCNC: 0.2 MG/DL
BUN SERPL-MCNC: 15 MG/DL (ref 7–18.7)
CALCIUM SERPL-MCNC: 9.9 MG/DL (ref 8.4–10.2)
CHLORIDE SERPL-SCNC: 106 MMOL/L (ref 98–107)
CO2 SERPL-SCNC: 22 MMOL/L (ref 22–29)
COLOR CSF: COLORLESS
CREAT SERPL-MCNC: 0.79 MG/DL (ref 0.55–1.02)
CRP SERPL-MCNC: 2.5 MG/L
EOSINOPHIL # BLD AUTO: 0 X10(3)/MCL (ref 0–0.9)
EOSINOPHIL NFR BLD AUTO: 0 %
ERYTHROCYTE [DISTWIDTH] IN BLOOD BY AUTOMATED COUNT: 17.1 % (ref 11.5–17)
ERYTHROCYTE [SEDIMENTATION RATE] IN BLOOD: 22 MM/HR (ref 0–20)
GFR SERPLBLD CREATININE-BSD FMLA CKD-EPI: >60 MLS/MIN/1.73/M2
GLOBULIN SER-MCNC: 3.7 GM/DL (ref 2.4–3.5)
GLUCOSE CSF-MCNC: 96 MG/DL (ref 40–70)
GLUCOSE SERPL-MCNC: 183 MG/DL (ref 74–100)
HCT VFR BLD AUTO: 38.2 % (ref 37–47)
HGB BLD-MCNC: 12 G/DL (ref 12–16)
IGG4 SER-MCNC: 45.1 MG/DL (ref 2.4–121)
IL6 SERPL-MCNC: <2 PG/ML
IMM GRANULOCYTES # BLD AUTO: 0.12 X10(3)/MCL (ref 0–0.04)
IMM GRANULOCYTES NFR BLD AUTO: 1 %
LYMPHOCYTES # BLD AUTO: 0.95 X10(3)/MCL (ref 0.6–4.6)
LYMPHOCYTES NFR BLD AUTO: 8 %
MAGNESIUM SERPL-MCNC: 2.1 MG/DL (ref 1.6–2.6)
MCH RBC QN AUTO: 26.3 PG (ref 27–31)
MCHC RBC AUTO-ENTMCNC: 31.4 G/DL (ref 33–36)
MCV RBC AUTO: 83.6 FL (ref 80–94)
MIXING STUDIES PPP-IMP: NORMAL
MONOCYTES # BLD AUTO: 0.27 X10(3)/MCL (ref 0.1–1.3)
MONOCYTES NFR BLD AUTO: 2.3 %
NEUTROPHILS # BLD AUTO: 10.47 X10(3)/MCL (ref 2.1–9.2)
NEUTROPHILS NFR BLD AUTO: 88.6 %
NRBC BLD AUTO-RTO: 0 %
PHOSPHATE SERPL-MCNC: 2.9 MG/DL (ref 2.3–4.7)
PLATELET # BLD AUTO: 365 X10(3)/MCL (ref 130–400)
PMV BLD AUTO: 10.4 FL (ref 7.4–10.4)
POCT GLUCOSE: 121 MG/DL (ref 70–110)
POCT GLUCOSE: 128 MG/DL (ref 70–110)
POCT GLUCOSE: 147 MG/DL (ref 70–110)
POTASSIUM SERPL-SCNC: 4.1 MMOL/L (ref 3.5–5.1)
PROT C ACT/NOR PPP CHRO: 125 % (ref 70–150)
PROT CSF-MCNC: 34.9 MG/DL (ref 15–45)
PROT SERPL-MCNC: 7.3 GM/DL (ref 6.4–8.3)
RBC # BLD AUTO: 4.57 X10(6)/MCL (ref 4.2–5.4)
RBC # CSF MANUAL: 0 /UL
SCREEN DRVVT/NORMAL: 1.04 RATIO
SODIUM SERPL-SCNC: 138 MMOL/L (ref 136–145)
WBC # CSF MANUAL: 2 /UL (ref 0–5)
WBC # SPEC AUTO: 11.82 X10(3)/MCL (ref 4.5–11.5)

## 2024-03-26 PROCEDURE — 009U3ZX DRAINAGE OF SPINAL CANAL, PERCUTANEOUS APPROACH, DIAGNOSTIC: ICD-10-PCS | Performed by: RADIOLOGY

## 2024-03-26 PROCEDURE — 84157 ASSAY OF PROTEIN OTHER: CPT | Performed by: STUDENT IN AN ORGANIZED HEALTH CARE EDUCATION/TRAINING PROGRAM

## 2024-03-26 PROCEDURE — C9113 INJ PANTOPRAZOLE SODIUM, VIA: HCPCS

## 2024-03-26 PROCEDURE — 25000003 PHARM REV CODE 250

## 2024-03-26 PROCEDURE — 85025 COMPLETE CBC W/AUTO DIFF WBC: CPT

## 2024-03-26 PROCEDURE — 80053 COMPREHEN METABOLIC PANEL: CPT

## 2024-03-26 PROCEDURE — 89051 BODY FLUID CELL COUNT: CPT | Performed by: STUDENT IN AN ORGANIZED HEALTH CARE EDUCATION/TRAINING PROGRAM

## 2024-03-26 PROCEDURE — 86140 C-REACTIVE PROTEIN: CPT

## 2024-03-26 PROCEDURE — 99223 1ST HOSP IP/OBS HIGH 75: CPT | Mod: ,,, | Performed by: PSYCHIATRY & NEUROLOGY

## 2024-03-26 PROCEDURE — 84100 ASSAY OF PHOSPHORUS: CPT

## 2024-03-26 PROCEDURE — 86053 AQAPRN-4 ANTB FLO CYTMTRY EA: CPT | Performed by: STUDENT IN AN ORGANIZED HEALTH CARE EDUCATION/TRAINING PROGRAM

## 2024-03-26 PROCEDURE — 83735 ASSAY OF MAGNESIUM: CPT

## 2024-03-26 PROCEDURE — 82040 ASSAY OF SERUM ALBUMIN: CPT | Performed by: STUDENT IN AN ORGANIZED HEALTH CARE EDUCATION/TRAINING PROGRAM

## 2024-03-26 PROCEDURE — 85652 RBC SED RATE AUTOMATED: CPT

## 2024-03-26 PROCEDURE — 63600175 PHARM REV CODE 636 W HCPCS

## 2024-03-26 PROCEDURE — 82945 GLUCOSE OTHER FLUID: CPT | Performed by: STUDENT IN AN ORGANIZED HEALTH CARE EDUCATION/TRAINING PROGRAM

## 2024-03-26 PROCEDURE — 87070 CULTURE OTHR SPECIMN AEROBIC: CPT | Performed by: STUDENT IN AN ORGANIZED HEALTH CARE EDUCATION/TRAINING PROGRAM

## 2024-03-26 PROCEDURE — 83605 ASSAY OF LACTIC ACID: CPT | Performed by: INTERNAL MEDICINE

## 2024-03-26 RX ORDER — OXYBUTYNIN CHLORIDE 15 MG/1
15 TABLET, EXTENDED RELEASE ORAL DAILY
Qty: 90 TABLET | Refills: 3 | Status: SHIPPED | OUTPATIENT
Start: 2024-03-26 | End: 2025-03-26

## 2024-03-26 RX ORDER — DIPHENHYDRAMINE HCL 25 MG
50 CAPSULE ORAL EVERY 6 HOURS PRN
Status: DISCONTINUED | OUTPATIENT
Start: 2024-03-26 | End: 2024-03-26 | Stop reason: HOSPADM

## 2024-03-26 RX ORDER — PREDNISONE 20 MG/1
60 TABLET ORAL DAILY
Qty: 90 TABLET | Refills: 2 | Status: SHIPPED | OUTPATIENT
Start: 2024-03-26 | End: 2024-04-10

## 2024-03-26 RX ADMIN — DEXTROSE MONOHYDRATE 500 MG: 5 INJECTION INTRAVENOUS at 01:03

## 2024-03-26 RX ADMIN — DIPHENHYDRAMINE HYDROCHLORIDE 50 MG: 25 CAPSULE ORAL at 12:03

## 2024-03-26 RX ADMIN — SERTRALINE HYDROCHLORIDE 75 MG: 50 TABLET ORAL at 08:03

## 2024-03-26 RX ADMIN — OXYBUTYNIN CHLORIDE 15 MG: 5 TABLET, EXTENDED RELEASE ORAL at 08:03

## 2024-03-26 RX ADMIN — PANTOPRAZOLE SODIUM 40 MG: 40 INJECTION, POWDER, FOR SOLUTION INTRAVENOUS at 11:03

## 2024-03-26 NOTE — PROGRESS NOTES
Consultation Report  Ophthalmology Service    Date: 03/26/2024    Chief complaint/Reason for Consult: optic nerve head edema, right eye     History of Present Illness: Alma Ca is a 19 y.o. female with complex ocular history as below who presents with 2 weeks of blurred vision of her right eye. Timeline per patient and mother:    August: crusting and redness of right eye, tx with abx drops, did not improve  Sept-Nov: treated by optom for redness, pain that woke patient up. Up to 100mg prednisone a day which helped but could not successfully taper down  Dec-Jan: seen by Brian, started on Brimonidine  Jan-Feb: seen by rheumatology, negative workup, started on MTX 6 pills per week  Feb-March: seen by Jose R, treated with Valtrex with some improvement, off steroids  March ~21st: 2 weeks of blurred vision with sudden onset noticed first thing in the morning also with blue photopsias. No change since. Otherwise symptoms stable.    Interval History: S/p 3rd dose of solumedrol yesterday, only 500 mg. No adverse reaction with this administration (co-administered benadryl). Overall pt feels good, she feels as though her vision is stable.     Family Hx: +psoriatic arthritis (mother) family history includes No Known Problems in her brother, father, mother, and sister.     PMHx:  has a past medical history of ADHD (attention deficit hyperactivity disorder), Anxiety, Bipolar disorder, unspecified, Depression, and Hyperhidrosis.     PSurgHx:  has a past surgical history that includes Tonsillectomy and Adenoidectomy.     Home Medications:   Prior to Admission medications    Medication Sig Start Date End Date Taking? Authorizing Provider   guanfacine HCl (INTUNIV ER ORAL) Intuniv ER Take No date recorded No form recorded No frequency recorded No route recorded No set duration recorded No set duration amount recorded active No dosage strength recorded No dosage strength units of measure recorded    Provider, Historical    lamoTRIgine (LAMICTAL) 25 MG tablet Take 50 mg by mouth every evening. 3/14/23   Provider, Historical   oxybutynin (DITROPAN XL) 15 MG TR24 Take 15 mg by mouth. 4/17/23   Provider, Historical   sertraline (ZOLOFT) 50 MG tablet Take 75 mg by mouth every morning. 6/1/23   Provider, Historical        Medications this encounter:    enoxparin  40 mg Subcutaneous Daily    lamoTRIgine  50 mg Oral QHS    methylPREDNISolone sodium succinate injection  500 mg Intravenous Once    NON FORMULARY MEDICATION 2 mg  2 mg Oral QHS    oxybutynin  15 mg Oral Daily    pantoprazole  40 mg Intravenous Daily    sertraline  75 mg Oral QAM       Allergies: has No Known Allergies.     Social:  reports that she has been smoking cigarettes. She has never used smokeless tobacco. She reports that she does not currently use alcohol. She reports that she does not use drugs.     ROS: As per HPI    Ocular examination/Dilated fundus examination:  Base Eye Exam       Visual Acuity (Snellen - Linear)         Right Left    Dist ph sc 20/100     Near sc 20/70 +2 20/20              Tonometry (Tonopen, 7:20 AM)         Right Left    Pressure 19 19              Pupils         Dark Light Shape React APD    Right 6 4 Round Slow +3    Left 6 4 Round Brisk None              Visual Fields         Right Left      Full    Restrictions Total inferior nasal deficiency               Extraocular Movement         Right Left     Full Full              Neuro/Psych       Mood/Affect: Normal              Dilation       Both eyes: 1% Mydriacyl, 2.5% Phenylephrine @ 7:21 AM                  Additional Tests       Color         Right Left    Ishihara 11/11 11/11                  Slit Lamp and Fundus Exam       External Exam         Right Left    External Normal Normal              Slit Lamp Exam         Right Left    Lids/Lashes Normal Normal    Conjunctiva/Sclera 1+ inj, worst inf White and quiet    Cornea Clear Clear    Anterior Chamber Deep and quiet Deep and quiet     Iris round, sluggish Round and reactive    Lens Clear Clear    Anterior Vitreous Normal Normal              Fundus Exam         Right Left    Disc grade 3 onh edema, extension of fluid 2-3DD circumferentially. Pink/sharp    C/D Ratio  <0.1    Macula nasal fluid regressing Flat    Vessels Normal Normal    Periphery Flat w/o heme/tear/detach Flat w/o heme/tear/detach                      Assessment/Plan:     1. 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 Had some improvement of symptoms.  - Initial examination 3/23/24: 2 weeks of blurred vision right eye, sudden onset first noticed in the morning without worse pain than usual. No improvement. Started on 60mg PO pred 3/22/24, sent to ED for evaluation.  -Right eye: 20/800, noel APD. Spared SN visual field, complete loss IN, relative loss ST/IT. 3+ ONH edema with tracking fluid and mild heme. Left eye: unremarkable except <0.1 cup/disc  3/24/24: 20/100 near with eccentric viewing (with great difficulty). Still inf > sup visual field depression, relative ST > SN sparing.   3/25/24: VA much improved, 20/25 near. Improving visual field especially superiorly. MRI negative and good response to steroids. 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 Plan to do 500mg solu medrol today and tomorrow. LP tomorrow then likely discharge pending attending evaluation.  03/26/24:  Patient had 500mg dose of solumedrol, no negative response this time. Vision slightly decreased from yesterday (able to read several letter from 20/50 line clearly, however had difficulty today with eccentric fixation). Color plates full OU. Dilated exam stable.   -Lumbar puncture scheduled for later today. After 03/26 dose of 500 mg Solumedrol and LP is performed, ok to discharge from ophtho perspective.  - Labwork: has had extensive infectious and inflammatory workup for scleritis performed  "previously   Previous negative: ESR/CRP/HLAB27/ACE/lysozyme/CCP/ANCA/quant gold/hep panel   Previous positive: +dsDNA with negative ROSITA panel, +RF  Negative: HIV, syphilis, homocysteine, fibrinogen, d-dimer, INR, APTT (slightly elevated)  Pending: CSF studies, further hypercoagulable labs, NMO/MOG serum abs, IGG4, soluble IL-2 receptor, IL6  - Imaging:  CT brain/orbit w/wo 3/23/24 unremarkable   MRI orbit w/wo & MRV 3/25/24 unremarkable  MRI Brain significant for bilateral white matter signal abnormality in occipital lobes.   - Differential: optic neuritis (typical versus MOG),  less likely NAION with concern for hypercoagulable state, infectious, inflammatory, mass    Recommendations:  - LP with CSF studies   - Complete  mg Solumedrol dose 03/26  - Transition to PO prednisone 60 mg daily until seen again by ophthalmology   - OK for discharge after LP/IV steroids from ophthalmology perspective  - Recommend outpatient neurology follow up especially given white matter changes on MRI  - RTC with ophthalmology in 1 week (will arrange follow up)        Please text/call if there are questions or concerns.  [phone]    Frankie Kelley MD ([facility])  [employer] [facility] PGY-2    "

## 2024-03-26 NOTE — PROCEDURES
Attempted to perform LP. Performed 3 separate attempts but was unable to access spinal column. Suspect due to needle not being long to access spinal column. Discussed with IR need for them to perform LP. Agreed to do LP on Tuesday (03/26/2024). Consulted IR to perform LP.     Ryan Garcia MD  7:41 AM

## 2024-03-26 NOTE — PROGRESS NOTES
Consultation Report  Ophthalmology Service    Date: 03/25/2024    Chief complaint/Reason for Consult: optic nerve head edema, right eye     History of Present Illness: Alma Ca is a 19 y.o. female with complex ocular history as below who presents with 2 weeks of blurred vision of her right eye. Timeline per patient and mother:    August: crusting and redness of right eye, tx with abx drops, did not improve  Sept-Nov: treated by optom for redness, pain that woke patient up. Up to 100mg prednisone a day which helped but could not successfully taper down  Dec-Jan: seen by Brian, started on Brimonidine  Jan-Feb: seen by rheumatology, negative workup, started on MTX 6 pills per week  Feb-March: seen by Jose R, treated with Valtrex with some improvement, off steroids  March ~21st: 2 weeks of blurred vision with sudden onset noticed first thing in the morning also with blue photopsias. No change since. Otherwise symptoms stable.    Interval History: MRI done this AM. LP attempted but unsuccessful. Has not had steroids yet today. Feels VA is improving.    Family Hx: +psoriatic arthritis (mother) family history includes No Known Problems in her brother, father, mother, and sister.     PMHx:  has a past medical history of ADHD (attention deficit hyperactivity disorder), Anxiety, Bipolar disorder, unspecified, Depression, and Hyperhidrosis.     PSurgHx:  has a past surgical history that includes Tonsillectomy and Adenoidectomy.     Home Medications:   Prior to Admission medications    Medication Sig Start Date End Date Taking? Authorizing Provider   guanfacine HCl (INTUNIV ER ORAL) Intuniv ER Take No date recorded No form recorded No frequency recorded No route recorded No set duration recorded No set duration amount recorded active No dosage strength recorded No dosage strength units of measure recorded    Provider, Historical   lamoTRIgine (LAMICTAL) 25 MG tablet Take 50 mg by mouth every evening. 3/14/23    Provider, Historical   oxybutynin (DITROPAN XL) 15 MG TR24 Take 15 mg by mouth. 4/17/23   Provider, Historical   sertraline (ZOLOFT) 50 MG tablet Take 75 mg by mouth every morning. 6/1/23   Provider, Historical        Medications this encounter:    enoxparin  40 mg Subcutaneous Daily    lamoTRIgine  50 mg Oral QHS    methylPREDNISolone sodium succinate injection  500 mg Intravenous Once    NON FORMULARY MEDICATION 2 mg  2 mg Oral QHS    oxybutynin  15 mg Oral Daily    pantoprazole  40 mg Intravenous Daily    sertraline  75 mg Oral QAM       Allergies: has No Known Allergies.     Social:  reports that she has been smoking cigarettes. She has never used smokeless tobacco. She reports that she does not currently use alcohol. She reports that she does not use drugs.     ROS: As per HPI    Ocular examination/Dilated fundus examination:  Base Eye Exam       Visual Acuity (Snellen - Linear)         Right Left    Dist cc  20/20    Near cc 20/25               Tonometry (Tonopen, 7:02 PM)         Right Left    Pressure 15 14              Pupils         Dark Light Shape React APD    Right 6 4 Round Sluggish 3+    Left 6 3 Round Brisk None              Visual Fields         Right Left      Full    Restrictions Total inferior nasal deficiency; Partial outer inferior temporal deficiency               Extraocular Movement         Right Left     Full Full              Dilation       Both eyes: 1% Mydriacyl, 2.5% Phenylephrine @ 7:02 PM                  Slit Lamp and Fundus Exam       External Exam         Right Left    External Normal Normal              Slit Lamp Exam         Right Left    Lids/Lashes Normal Normal    Conjunctiva/Sclera 1-2+ inj, worst inf White and quiet    Cornea Clear Clear    Anterior Chamber Deep and quiet Deep and quiet    Iris round, sluggish Round and reactive    Lens Clear Clear    Anterior Vitreous Normal Normal              Fundus Exam         Right Left    Disc grade 3 onh edema, extension of fluid  2-3DD circumferentially. Pink/sharp    C/D Ratio  <0.1    Macula nasal fluid regressing Flat    Vessels Normal Normal    Periphery Flat w/o heme/tear/detach Flat w/o heme/tear/detach                      Assessment/Plan:     1. 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 Had some improvement of symptoms.  - Initial examination 3/23/24: 2 weeks of blurred vision right eye, sudden onset first noticed in the morning without worse pain than usual. No improvement. Started on 60mg PO pred 3/22/24, sent to ED for evaluation.  -Right eye: 20/800, noel APD. Spared SN visual field, complete loss IN, relative loss ST/IT. 3+ ONH edema with tracking fluid and mild heme. Left eye: unremarkable except <0.1 cup/disc  3/24/24: 20/100 near with eccentric viewing (with great difficulty). Still inf > sup visual field depression, relative ST > SN sparing.   3/25/24: VA much improved, 20/25 near. Improving visual field especially superiorly. MRI negative and good response to steroids. 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 Plan to do 500mg solu medrol today and tomorrow. LP tomorrow then likely discharge pending attending evaluation.  - Labwork: has had extensive infectious and inflammatory workup for scleritis performed previously   Previous negative: ESR/CRP/HLAB27/ACE/lysozyme/CCP/ANCA/quant gold/hep panel   Previous positive: +dsDNA with negative ROSITA panel, +RF  Negative: HIV, syphilis, homocysteine, fibrinogen, d-dimer, INR, APTT (slightly elevated)  Pending: CSF studies, further hypercoagulable labs, NMO/MOG serum abs, IGG4, soluble IL-2 receptor, IL6  - Imaging:  CT brain/orbit w/wo 3/23/24 unremarkable   MRI/MRV brain/orbit w/wo 3/25/24 unremarkable  - Differential: optic neuritis (typical versus MOG),  less likely NAION with concern for hypercoagulable state, infectious, inflammatory, mass    Recommendations:  - LP when  available  - Solu Medrol 500mg daily today and tomorrow    Possible discharge after LP.      Please text/call if there are questions or concerns.  [phone]      Frankie Garcia MD PGY-4  [facility] Ophthalmology Resident  03/25/2024  1:02 PM

## 2024-03-26 NOTE — DISCHARGE SUMMARY
[facility] Discharge Summary    Admitting physician: Adis Lane MD  Attending physician: No att. providers found  Date of admit: 3/23/2024  Date of discharge: 3/26/2024    *Important note: This note was created with the use of dictation and thereby may include syntax, grammatical, verbiage, and spelling errors, among others. Please see patient chart and/or feel free to contact me with any questions or confusions needing clarification.*    Condition: Stable  Outcome: Patient tolerated treatment/procedure well without complication and is now ready for discharge.  Disposition: Home or Self Care    Discharge Diagnoses     Principal Problem:  Vision loss of right eye    Patient Active Problem List   Diagnosis    Optic disc edema    Vision loss of right eye     Consultants and Procedures     Consultants:  Consults (From admission, onward)          Status Ordering Provider     Inpatient consult to Neurology  Once        Provider:  Caitlyn Riojas MD    Completed CLARISSA GUPTA     Inpatient consult to Interventional Radiology  Once        Provider:  Martin Reid MD    Completed CLARISSA GUPTA     Inpatient consult to Internal Medicine  Once        Provider:  Clarissa Gupta MD    Acknowledged CARITO PARHAM     Inpatient consult to Ophthalmology  Once        Provider:  Frankie Kelley MD    Completed CARITO PARHAM           Procedures:  * No surgery found *    Brief History of Present Illness      Alma Ca is a 19 y.o.  American female with PMH of obesity, bipolar 2 disorder, attention deficit hyperactivity disorder, anxiety, depression, hyperhidrosis who presented to [facility] ED (3/23/2024) due to worsening vision in right eye.  Patient reports that symptoms initially began approximately a month ago (August 2023) with acute onset of right scleral injection and eye pain.  Patient is being followed by Ophthalmology plus Rheumatology and was being treated for active nearest scleritis with  varying amounts of oral prednisone and Valtrex with only mild improvement in symptoms temporarily.  Beginning approximately 2 weeks ago patient noted acute onset vision loss to right eye such that she was unable to see objects in her right lateral, right medial, central visual fields.  Patient was seen by Ophthalmology post onset of symptoms at which time she was started on p.o. prednisone 60 mg but when patient did not experience improvement in symptoms times 24 hours she was referred to ED for evaluation.  Ophthalmology evaluated and consulted Internal Medicine for admission.     ED course:  Patient hypertensive but remaining vital signs stable.  CBC unremarkable.  CMP unremarkable.  Urinalysis unremarkable.  CT head with and without contrast unremarkable.  CT head with and without contrast plus CT orbit with and without contrast ophthalmology.    Hospital Course with Pertinent Findings     Patient admitted for initiation of IV steroids and workup of acute vision loss.  Initiated IV Solu-Medrol 1000 mg daily which patient received 1 dose of on admission.  Patient suffered transfusion reaction and IV Solu-Medrol subsequently decreased to 500 mg daily x2 days requiring IV Benadryl 25 mg for transfusion prophylaxis.  Ophthalmology performed daily slit-lamp and fundus exam is.  Initial examination showed right eye: 20/800, noel APD. Spared SN visual field, complete loss IN, relative loss ST/IT. 3+ ONH edema with tracking fluid and mild heme. Left eye: unremarkable except <0.1 cup/disc. ESR/CRP/HLAB27/ACE/lysozyme/CCP/ANCA/quant gold/hep panel negative as outpatient. Positive dsDNA but with negative ROSITA panel and positive rheumatoid factor as outpatient. HIV and syphilis negative. Hypercoagulable workup, NMO/MOG serum abs, IGG4, soluble IL-2 receptor, IL6 pending. CT head with and without contrast negative for acute abnormality and evidence of demyelination. CT orbit with and without contrast negative for acute  abnormality. Repeat slit-lamp and fundus examination on 3/24/24 showed  20/100 near with eccentric viewing (with great difficulty). Still inf > sup visual field depression, relative ST > SN sparing. MRI brain with and without contrast unremarkable. 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 MRI orbits with and without contrast unremarkable.  Attempted to perform LP at bedside but was unsuccessful.  Consulted IR to perform LP.  IR performed LP (03/25/2024).  CSF studies ordered and to be followed by Ophthalmology as outpatient. Repeat slit-lamp and fundus examination on 3/25/24 showed VA much improved, 20/25 near. Improving visual field especially superiorly. Repeat and fundus examination on 03/26/2024 showed vision slightly decreased from yesterday (able to read several letter from 20/50 line clearly, however had difficulty today with eccentric fixation). Color plates full OU. Dilated exam stable. Ophthalmology requesting evaluation and recs by [facility] neurology.  Neurology consulted who reviewed patient's CT and MRI indicating MS and NMO unlikely. Patient received last dose of IV Solu-Medrol 500 mg.  [facility] ophthalmology and neurology both deemed stable for discharge with close outpatient follow up from their respective opinions.  Ophthalmology requesting patient be discharged with prednisone 60 mg daily to be continued to patient follows up with [facility] ophthalmology. Patient deemed to maximize inpatient treatment.  Discuss discharge planning with patient who was agreeable.  Patient discharged to home with close follow-up.  Instructed patient to call PCP to schedule post hospitalization follow-up and to maintain appointments with [facility] ophthalmology (04/05/2024) and Dr. Costa with neurology (04/02/2024).  Initiated prednisone 60 mg daily per ophthalmology recs to be continued until patient follows up with [facility] ophthalmology.  Restarted remaining home meds.    Objective  "    Vital Signs:  Vitals  BP: 135/82  Temp: 98.3 °F (36.8 °C)  Temp Source: Oral  Pulse: 94  Resp: 18  SpO2: 98 %  Height: 5' 8" (172.7 cm)  Weight: 118.4 kg (261 lb 0.4 oz)    Physical Exam  Physical Exam    Discharge Medications        Medication List        CHANGE how you take these medications      oxybutynin 15 MG Tr24  Commonly known as: DITROPAN XL  Take 1 tablet (15 mg total) by mouth once daily.  What changed: when to take this            CONTINUE taking these medications      BIOTIN ORAL     ibuprofen 200 MG tablet  Commonly known as: ADVIL,MOTRIN     INTUNIV ER ORAL     lamoTRIgine 25 MG tablet  Commonly known as: LAMICTAL     MELATIN 3 mg tablet  Generic drug: melatonin     methotrexate 2.5 MG Tab     predniSONE 20 MG tablet  Commonly known as: DELTASONE  Take 3 tablets (60 mg total) by mouth once daily.     sertraline 50 MG tablet  Commonly known as: ZOLOFT     valACYclovir 1000 MG tablet  Commonly known as: VALTREX            STOP taking these medications      NON FORMULARY MEDICATION     NON FORMULARY MEDICATION               Where to Get Your Medications        These medications were sent to [facility][id number] [address] & [address] [address]      Hours: 24-hours Phone: [phone]   oxybutynin 15 MG Tr24  predniSONE 20 MG tablet       Discharge Information:     TIME SPENT ON DISCHARGE: 60 minutes    Ryan Garcia MD  [facility] Internal Medicine, PGY-1    "

## 2024-03-26 NOTE — PROGRESS NOTES
[facility] Internal Medicine Wards Progress Note    Resident Team: [facility] Medicine List 3  Attending Physician: Adis Lane MD  Resident: Chelsi Castro MD  Intern: Ryan Garcia MD    Subjective:      History of Present Illness:  Alma Ca is a 19 y.o.  American female with PMH of obesity, bipolar 2 disorder, attention deficit hyperactivity disorder, anxiety, depression, hyperhidrosis who presented to [facility] ED (3/23/2024) due to worsening vision in right eye.  Patient reports that symptoms initially began approximately a month ago (August 2023) with acute onset of right scleral injection and eye pain.  Patient is being followed by Ophthalmology plus Rheumatology and was being treated for active nearest scleritis with varying amounts of oral prednisone and Valtrex with only mild improvement in symptoms temporarily.  Beginning approximately 2 weeks ago patient noted acute onset vision loss to right eye such that she was unable to see objects in her right lateral, right medial, central visual fields.  Patient was seen by Ophthalmology post onset of symptoms at which time she was started on p.o. prednisone 60 mg but when patient did not experience improvement in symptoms times 24 hours she was referred to ED for evaluation.  Ophthalmology evaluated and consulted Internal Medicine for admission.    ED course:  Patient hypertensive but remaining vital signs stable.  CBC unremarkable.  CMP unremarkable.  Urinalysis unremarkable.  CT head with and without contrast unremarkable.  CT head with and without contrast plus CT orbit with and without contrast ophthalmology.    Interval History:   NAEON. Vitas stable. Performed LP yesterday. Patient had significant lumbar pain 2/2 procedure requiring IV dilaudid 0.5 mg x 1 dose plus IV morphine 2 g q4h prn requiring 1 dose to date. Pain significantly improved but patient still reporting soreness to LP site this a.m. Reports improvement in vision after evaluation by  ophthalmology yesterday evening stating she was able to read the card/board in its entirety. Denies any other acute complaints. Consulted IR for LP today. CSF studies ordered by ophthalmology.     Past Medical History:  Past Medical History:   Diagnosis Date    ADHD (attention deficit hyperactivity disorder)     Anxiety     Bipolar disorder, unspecified     Depression     Hyperhidrosis      Past Surgical History:  Past Surgical History:   Procedure Laterality Date    ADENOIDECTOMY      TONSILLECTOMY       Family History:  Family History   Problem Relation Age of Onset    No Known Problems Mother     No Known Problems Father     No Known Problems Sister     No Known Problems Brother      Social History:  Social History     Tobacco Use    Smoking status: Some Days     Types: Cigarettes    Smokeless tobacco: Never   Substance Use Topics    Alcohol use: Not Currently    Drug use: Never     Allergies:  Review of patient's allergies indicates:  No Known Allergies  Home Medications:  Prior to Admission medications    Medication Sig Start Date End Date Taking? Authorizing Provider   BIOTIN ORAL Take 200 mg by mouth once daily.   Yes Provider, Historical   ibuprofen (ADVIL,MOTRIN) 200 MG tablet Take 400 mg by mouth 2 (two) times daily as needed for Pain.   Yes Provider, Historical   methotrexate 2.5 MG Tab Take 7.5 mg by mouth every 7 days.   Yes Provider, Historical   NON FORMULARY MEDICATION Take 1 capsule by mouth once daily.   Yes Provider, Historical   predniSONE (DELTASONE) 20 MG tablet Take 60 mg by mouth once daily.   Yes Provider, Historical   valACYclovir (VALTREX) 1000 MG tablet Take 1,000 mg by mouth 2 (two) times daily.   Yes Provider, Historical   guanfacine HCl (INTUNIV ER ORAL) Intuniv ER Take No date recorded No form recorded No frequency recorded No route recorded No set duration recorded No set duration amount recorded active No dosage strength recorded No dosage strength units of measure recorded     Provider, Historical   lamoTRIgine (LAMICTAL) 25 MG tablet Take 50 mg by mouth every evening. 3/14/23   Provider, Historical   melatonin (MELATIN) 3 mg tablet Take 5 mg by mouth nightly as needed for Insomnia.    Provider, Historical   NON FORMULARY MEDICATION Take 1 capsule by mouth once daily.    Provider, Historical   oxybutynin (DITROPAN XL) 15 MG TR24 Take 15 mg by mouth. 23   Provider, Historical   sertraline (ZOLOFT) 50 MG tablet Take 75 mg by mouth every morning. 23   Provider, Historical     Review of Systems:  Review of Systems   Constitutional:  Negative for chills, diaphoresis, fatigue and fever.   HENT:  Negative for ear pain, hearing loss, rhinorrhea, sore throat, tinnitus and trouble swallowing.    Eyes:  Positive for visual disturbance. Negative for pain and redness.   Respiratory:  Negative for cough, chest tightness and shortness of breath.    Cardiovascular:  Negative for chest pain and palpitations.   Gastrointestinal:  Negative for abdominal pain, constipation, diarrhea, nausea and vomiting.   Genitourinary:  Negative for difficulty urinating, dysuria, frequency, hematuria and urgency.   Musculoskeletal:  Negative for arthralgias and myalgias.   Skin:  Negative for rash and wound.   Neurological:  Negative for dizziness, seizures, syncope, weakness, light-headedness, numbness and headaches.   Psychiatric/Behavioral:  Negative for confusion.         Objective:   Last 24 Hour Vital Signs:  BP  Min: 106/61  Max: 136/79  Temp  Av.1 °F (36.7 °C)  Min: 98 °F (36.7 °C)  Max: 98.4 °F (36.9 °C)  Pulse  Av.2  Min: 63  Max: 81  Resp  Av.3  Min: 17  Max: 20  SpO2  Av %  Min: 96 %  Max: 100 %  Body mass index is 39.69 kg/m².  I/O last 3 completed shifts:  In: 660 [P.O.:660]  Out: -     Physical Examination:  Physical Exam  Vitals reviewed.   Constitutional:       General: She is not in acute distress.     Appearance: Normal appearance. She is obese. She is not ill-appearing,  toxic-appearing or diaphoretic.   HENT:      Head: Normocephalic and atraumatic.      Right Ear: External ear normal.      Left Ear: External ear normal.   Eyes:      General: No scleral icterus.        Right eye: No discharge.         Left eye: No discharge.      Extraocular Movements: Extraocular movements intact.      Pupils: Pupils are equal, round, and reactive to light.      Comments: Right sclera injected  Left sclera normal    See optho note for detailed visual field exam     Cardiovascular:      Rate and Rhythm: Normal rate and regular rhythm.      Pulses: Normal pulses.      Heart sounds: Normal heart sounds. No murmur heard.     No friction rub. No gallop.   Pulmonary:      Effort: Pulmonary effort is normal. No respiratory distress.      Breath sounds: Normal breath sounds. No wheezing, rhonchi or rales.   Abdominal:      General: Abdomen is flat. Bowel sounds are normal. There is no distension.      Palpations: Abdomen is soft.      Tenderness: There is no abdominal tenderness. There is no guarding.   Musculoskeletal:         General: No swelling, tenderness, deformity or signs of injury. Normal range of motion.      Right lower leg: No edema.      Left lower leg: No edema.   Skin:     General: Skin is warm and dry.      Capillary Refill: Capillary refill takes less than 2 seconds.      Coloration: Skin is not jaundiced.      Findings: No bruising, erythema or rash.   Neurological:      Mental Status: She is alert.      Comments: AAO to person, place, time, and situation; CN II-XII grossly intact         Laboratory:  Most Recent Data:  CBC:   Lab Results   Component Value Date    WBC 11.82 (H) 03/26/2024    HGB 12.0 03/26/2024    HCT 38.2 03/26/2024     03/26/2024    MCV 83.6 03/26/2024    RDW 17.1 (H) 03/26/2024     WBC Differential:   Recent Labs   Lab 03/23/24  1252 03/24/24  0329 03/25/24  0350 03/26/24  0400   WBC 10.96 18.93* 24.76* 11.82*   HGB 13.4 12.5 11.4* 12.0   HCT 41.4 39.1 36.0*  "38.2    381 374 365   MCV 82.8 83.0 81.6 83.6       BMP:   Lab Results   Component Value Date     03/26/2024    K 4.1 03/26/2024    CO2 22 03/26/2024    BUN 15.0 03/26/2024    CREATININE 0.79 03/26/2024    CALCIUM 9.9 03/26/2024    MG 2.10 03/26/2024    PHOS 2.9 03/26/2024     LFTs:   Lab Results   Component Value Date    ALBUMIN 3.6 03/26/2024    BILITOT 0.2 03/26/2024    AST 12 03/26/2024    ALKPHOS 57 03/26/2024    ALT 15 03/26/2024     Coags:   Lab Results   Component Value Date    INR 1.0 03/23/2024    PROTIME 13.3 03/23/2024    PTT 34.4 (H) 03/23/2024     FLP:   Lab Results   Component Value Date    CHOL 178 (H) 10/03/2023    HDL 44 10/03/2023    LDLCALC 121 (H) 10/03/2023    TRIG 69 10/03/2023     DM:   Lab Results   Component Value Date    HGBA1C 5.1 03/23/2024    LDLCALC 121 (H) 10/03/2023    CREATININE 0.79 03/26/2024     Thyroid:   Lab Results   Component Value Date    TSH 0.586 10/03/2023     Anemia: No results found for: "IRON", "TIBC", "FERRITIN", "IOTRPESE18", "FOLATE"  Cardiac: No results found for: "TROPONINI", "CKTOTAL", "CKMB", "BNP"  Urinalysis:   Lab Results   Component Value Date    PHUA 5.5 03/23/2024    UROBILINOGEN Normal 03/23/2024    WBCUA 0-5 03/23/2024     Trended Lab Data:  Recent Labs   Lab 03/24/24  0329 03/25/24  0350 03/25/24  0351 03/26/24  0400   WBC 18.93* 24.76*  --  11.82*   HGB 12.5 11.4*  --  12.0   HCT 39.1 36.0*  --  38.2    374  --  365   MCV 83.0 81.6  --  83.6   RDW 16.7 17.2*  --  17.1*     --  141 138   K 3.7  --  3.8 4.1   CO2 22  --  22 22   BUN 12.8  --  15.4 15.0   CREATININE 0.79  --  0.74 0.79   ALBUMIN 4.0  --  3.6 3.6   BILITOT 0.3  --  0.2 0.2   AST 15  --  14 12   ALKPHOS 62  --  63 57   ALT 17  --  15 15       Trended Cardiac Data:  No results for input(s): "TROPONINI", "CKTOTAL", "CKMB", "BNP" in the last 168 hours.  Radiology:  Imaging Results              CT Head W Wo Contrast (Final result)  Result time 03/23/24 14:17:13      " Final result by Cristina Sherwood MD (03/23/24 14:17:13)                   Impression:      No acute intracranial abnormality.      Electronically signed by: Cristina Sherwood  Date:    03/23/2024  Time:    14:17               Narrative:    EXAMINATION:  CT HEAD WITH AND WITHOUT    CLINICAL HISTORY:  vision loss, papiledema;    TECHNIQUE:  Axial scans were obtained from skull base to the vertex with and without contrast.    Coronal and sagittal reconstructions obtained from the axial data.    Automatic exposure control was utilized to limit radiation dose.    Radiation Dose:    Total DLP: 1731 mGy*cm    COMPARISON:  None available    FINDINGS:  There is no acute intracranial hemorrhage or edema. The gray-white matter differentiation is preserved. There is no abnormal intracranial enhancement    There is no mass effect or midline shift. The ventricles and sulci are normal in size. The basal cisterns are patent. There is no abnormal extra-axial fluid collection. The major vessels enhance normally.    The calvarium and skull base are intact. The visualized paranasal sinuses and the mastoid air cells are clear.                                       CT Orbits Sella Post Fossa W Wo Contrast (Final result)  Result time 03/23/24 14:19:57      Final result by Cristina Sherwood MD (03/23/24 14:19:57)                   Impression:      No acute abnormality of the orbits.      Electronically signed by: Cristina Sherwood  Date:    03/23/2024  Time:    14:19               Narrative:    EXAMINATION:  CT ORBITS SELLA POST FOSSA W WO CONTRAST    CLINICAL HISTORY:  Vision loss, papiledema;    TECHNIQUE:  Axial scans were obtained through the orbits with and without contrast.    Coronal and sagittal reconstructions obtained from the axial data.    Automatic exposure control was utilized to limit radiation dose.    Radiation Dose:    Total DLP: 1731 mGy*cm    COMPARISON:  None available    FINDINGS:  The globes are normal in  contour.  The optic nerves are intact.  The extraocular muscles are symmetric in size.  There is no postseptal abnormality of the orbits.  The lacrimal gland unremarkable.  Meckel's caves and the cavernous sinuses are normal in appearance.  There is no acute fracture or destructive bone lesion.  The paranasal sinuses are clear.                                       Assessment & Plan:     Acute monocular (right) vision loss  Central vision loss of right eye  Right optic nerve head edema  Right optic nerve scleritis  -ophthalmology consulted and following  -fundus exam per ophthalmology showed:  03/23/2024  -Right eye: 20/800, noel APD. Spared SN visual field, complete loss IN, relative loss ST/IT. 3+ ONH edema with tracking fluid and mild heme  -Left eye: unremarkable except <0.1 cup/disc     03/24/2024: 20/100 near with eccentric viewing (with great difficulty). Still inf > sup visual field depression, relative ST > SN sparing.     03/25/2024: VA much improved, 20/25 near. Improving visual field especially superiorly.  -differential diagnoses at this time include demyelinating (atypical optic neuritis), NAION with concern for hypercoagulable state, infectious, inflammatory, mass per ophthalmology  -considering severe 'typical' neuritis at this time per ophthalmology  -ESR/CRP/HLAB27/ACE/lysozyme/CCP/ANCA/quant gold/hep panel negative as outpatient  -positive dsDNA but with negative ROSITA panel and positive rheumatoid factor as outpatient  -HIV and syphilis negative  -hypercoagulable workup, NMO/MOG serum abs, IGG4, soluble IL-2 receptor, IL6 pending  -CT head with and without contrast negative for acute abnormality and evidence of demyelination  -CT orbit with and without contrast negative for acute abnormality  -MRI brain with and without contrast unremarkable  -MRV brain with and without contrast showed evidence of dural venous sinus thrombosis  -MRI orbits with and without contrast unremarkable  -ophthalmology  requesting LP for CSF studies  -LP attempted by internal medicine but unsuccessful  -consulted IR for LP today  -CSF studies ordered per ophthalmology  -continue IV solumedrol 500 mg qd with IV benadryl 25 mg before infusion to avoid potential infusion reaction    Obesity  Bipolar 2 disorder  Attention deficit hyperactivity disorder  Anxiety  Depression  -patient family contacted outside psychiatrist who requested continuation of psych meds while inpatient unless otherwise indicated  -continue guanfacine ER 2 mg qhs, Motrin 50 mg q.h.s. and sertraline 75 mg every morning    Hyperhidrosis  -asymptomatic  -continue home oxybutynin 15 mg daily  -we will continue to monitor    Code Status:  Full code  GI Access:  P.o.  Feeding:  Adult regular diet  IV Access:  PIV  Fluids:  None  Analgesia:  Acetaminophen 650 mg q.4 hours p.r.n. mild pain  Thromboprophylaxis: Lovenox and SCDs    Disposition: Continue inpatient status for treatment of acute vision loss with management per Ophthalmology. Will plan for discharge when medically stable. Possibly today.    Ryan Garcia MD  [employer] Internal Medicine, PGY-1

## 2024-03-26 NOTE — CONSULTS
[facility] Neurology Inpatient Consult    Reason for Consult:      Right Eye vision loss    Chief Complaint:     Chief Complaint   Patient presents with    Eye Problem     C/o loss of vision right eye last 2 weeks. States her eye dr in [place] has referred her to optho clinic here and was told to come today for mri by dr. Dudley.        History of Present Illness:      This is a 19 y.o. female with history of OD scleritis who is admitted for vision loss OD. She reported OD redness with discomfort since 8/2023. She stated that she had tried multiple rounds of topical steroids, oral steroids, and valtrex with partial resolution of redness. She was referred to Rheumatology for further evaluation and was noted to have mildly elevated RF. She was was started on MTX  given family history significant for mother with psoriatic arthritis. 2 weeks ago, she noted acute onset decrease visual acuity upon awakening with photopsia. She was admitted from ophthalmology clinic for OD APD and optic nerve head edema. She had received IV pulse steroids with significant improvement of her vision. Patient denied any rashes or joint pain. She denied any history of tobacco or alcohol use. Has new animal/pet exposure for the past 1 year.     Past Medical History:     Past Medical History:   Diagnosis Date    ADHD (attention deficit hyperactivity disorder)     Anxiety     Bipolar disorder, unspecified     Depression     Hyperhidrosis        Medications:      enoxparin  40 mg Subcutaneous Daily    lamoTRIgine  50 mg Oral QHS    methylPREDNISolone sodium succinate injection  500 mg Intravenous Once    NON FORMULARY MEDICATION 2 mg  2 mg Oral QHS    oxybutynin  15 mg Oral Daily    pantoprazole  40 mg Intravenous Daily    sertraline  75 mg Oral QAM      dextrose 10%    dextrose 10%    diphenhydrAMINE    diphenhydrAMINE    glucagon (human recombinant)    glucose    glucose    insulin aspart U-100    LORazepam     melatonin    morphine    naloxone    sodium chloride 0.9%    traZODone         Labs:      Latest Reference Range & Units Most Recent   Sed Rate 0 - 20 mm/hr 22 (H)  3/26/24 04:00   PTT 23.2 - 33.7 seconds 34.4 (H)  3/23/24 13:43   Fibrinogen 210 - 463 mg/dL 376  3/23/24 13:43   D-Dimer 0.00 - 0.50 ug/mL FEU 0.31  3/23/24 13:43   Sodium 136 - 145 mmol/L 138  3/26/24 04:00   Potassium 3.5 - 5.1 mmol/L 4.1  3/26/24 04:00   Chloride 98 - 107 mmol/L 106  3/26/24 04:00   CO2 22 - 29 mmol/L 22  3/26/24 04:00   BUN 7.0 - 18.7 mg/dL 15.0  3/26/24 04:00   Creatinine 0.55 - 1.02 mg/dL 0.79  3/26/24 04:00   eGFR mls/min/1.73/m2 >60  3/26/24 04:00   Glucose 74 - 100 mg/dL 183 (H)  3/26/24 04:00   Calcium 8.4 - 10.2 mg/dL 9.9  3/26/24 04:00   Phosphorus Level 2.3 - 4.7 mg/dL 2.9  3/26/24 04:00   Magnesium  1.60 - 2.60 mg/dL 2.10  3/26/24 04:00   ALP 40 - 150 unit/L 57  3/26/24 04:00   PROTEIN TOTAL 6.4 - 8.3 gm/dL 7.3  3/26/24 04:00   Albumin 3.5 - 5.0 g/dL 3.6  3/26/24 04:00   Albumin/Globulin Ratio 1.1 - 2.0 ratio 1.0 (L)  3/26/24 04:00   CRP <5.00 mg/L 2.50  3/26/24 04:00   Cholesterol Total 100 - 169 mg/dL 178 (H) (E)  10/3/23 08:11   Homocysteine 5.1 - 15.4 umol/L 7.2  3/23/24 13:44   Vitamin D 30.0 - 80.0 ng/mL 29.5 (L)  3/24/24 03:29   TSH 0.450 - 4.500 uIU/mL 0.586 (E)  10/3/23 08:11   hCG Qualitative, Urine Negative  Negative  3/23/24 12:00   HIV Nonreactive  Nonreactive  3/23/24 13:44   Syphilis Antibody Nonreactive, Equivocal  Nonreactive  3/23/24 13:43   Appear CSF Clear  Clear  3/26/24 09:13   COLOR CSF Colorless  Colorless  3/26/24 09:13   RBC, CSF /uL 0  3/26/24 09:13   WBC, CSF 0 - 5 /uL 2  3/26/24 09:13   Basophils, CSF % Not Estab. % 0 (E)  10/3/23 08:11   Glucose CSF 40 - 70 mg/dL 96 (H)  3/26/24 09:13   Protein CSF  15.0 - 45.0 mg/dL 34.9  3/26/24 09:13   Antithrombin Activity, P 80 - 130 % 98  3/23/24 13:44   FACTOR VIII 59 - 191 % 183  3/24/24 14:31   IR LUMBAR PUNCTURE DIAGNOSTIC WITH IMAGING  Rpt  3/26/24  09:13   Mean Corpuscular Volume 79 - 97 fL 83 (E)  10/3/23 08:11   Monocytes Absolute Count 0.1 - 0.9 x10E3/uL 1.0 (H) (E)  10/3/23 08:11   Monocytes % Not Estab. % 8 (E)  10/3/23 08:11   Protein C Activity, P 70 - 150 % 125  3/23/24 13:44   Red Blood Cell Count 3.77 - 5.28 x10E6/uL 4.68 (E)  10/3/23 08:11   SARS-COV-2 RDRP GENE  Rpt  7/31/23 17:31   (H): Data is abnormally high  (L): Data is abnormally low  (E): External lab result  Rpt: View report in Results Review for more information    Studies:      MRI Brain +/- Cristofer 3/25/2024:  I have reviewed the study independently and with the patient. Periventricular white matter artifact due to magnet artifact from unstable magnet field.     MRI Orbit +/- Cristofer 3/25/2024:  I have reviewed the study independently and with the patient. No edema nor contrast enhancement noted.     Review of Systems:     Review of Systems   Eyes:  Positive for blurred vision and redness.   All other systems reviewed and are negative.      Physical Exams:     Vitals:    03/26/24 1115   BP: (!) 145/98   Pulse: 100   Resp: 18   Temp: 97.9 °F (36.6 °C)       Physical Exam  Vitals and nursing note reviewed.   Constitutional:       Appearance: Normal appearance.   HENT:      Head: Normocephalic and atraumatic.      Nose: Nose normal.      Mouth/Throat:      Mouth: Mucous membranes are moist.      Pharynx: Oropharynx is clear.   Eyes:      Comments: OD conjunctival injection   Cardiovascular:      Rate and Rhythm: Normal rate and regular rhythm.      Pulses: Normal pulses.   Pulmonary:      Effort: Pulmonary effort is normal.      Breath sounds: Normal breath sounds.   Abdominal:      General: Abdomen is flat.   Musculoskeletal:         General: Normal range of motion.      Cervical back: Normal range of motion.   Skin:     General: Skin is warm.   Neurological:      Mental Status: She is alert.         Comprehensive Neurological Exam:  Mental Status: Alert Oriented to Self, Date, and Place. Naming,  repetition, reading, and writing wnl. Comprehension wnl. No dysarthria.   CN II - XII: Pharmacologically dilated pupils OU, VFFC, No ptosis OU, EOMI without nystagmus, LT/Temp symmetric in CN V1-3 distribution, Hearing grossly intact, Face Symmetric, Tongue and Uvula midline, Trapezius symmetric bilateral.   Motor: tone and bulk wnl throughout, no abnormal involuntary or voluntary movements, 5/5 to confrontation, Fine finger movements wnl b/l, No pronator drift.   Sensory: LT, Proprioception, Vibration, PP, Temp symmetric.   Reflexes: 2+ throughout  Cerebellar: FNF wnl b/l, RAHM wnl b/l,      Assessment:     This is a 19 y.o. 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 MRI Brain finding are artifactual in nature due to poor magnet stability. MS unlikely. Pending NMO and MOG Ab, though NMO is less likely given lack of long segment  inflammatory changes of optic nerve associated with NMO.     Plan:     [] pending CSF OCB for possible clinically isolated syndrome with optic neuritis.   [] pending serum MOG-Ab.   [] autoimmune work up and infectious work up as per ophthalmology for chronic epi-scleritis and papillitis. Consider tissue biopsy if serology unremarkable.     Can refer to neurology for Clinically Isolated Syndrome if CSF OCB is positive. Otherwise would recommend following up with rheumatology and ophthalmology.       Caitlyn Riojas MD   General Neurology  03/26/2024

## 2024-03-27 LAB
LPA SERPL-SCNC: 139 NMOL/L
MAYO GENERIC ORDERABLE RESULT: NORMAL

## 2024-03-28 LAB
ALB CSF/SERPL: 6.4 RATIO
ALBUMIN CSF-MCNC: 27 MG/DL
ALBUMIN SERPL-MCNC: 4245 MG/DL
IGG CSF-MCNC: 3.6 MG/DL
IGG SERPL-MCNC: 1150 MG/DL
IGG SYNTH RATE SER+CSF CALC-MRATE: <0 MG/D
IGG/ALB CLEAR SER+CSF-RTO: 0.49 RATIO
IGG/ALB CSF: 0.13 RATIO
MAYO GENERIC ORDERABLE RESULT: NORMAL
OLIGOCLONAL BANDS CSF ELPH-IMP: NEGATIVE
OLIGOCLONAL BANDS CSF ELPH-IMP: NORMAL
OLIGOCLONAL BANDS CSF IEF: 0 BANDS

## 2024-03-29 LAB
COAGULATION SPECIALIST REVIEW: NORMAL
F5 P.R506Q BLD/T QL: NEGATIVE
PROVIDER SIGNING NAME: NORMAL

## 2024-03-29 NOTE — PROGRESS NOTES
"Subjective:      Patient ID: Alma Ca is a 19 y.o. female.    Chief Complaint:  No chief complaint on file.      History of Present Illness  HPI 19 years old Female with PMHx Vision lost came with Mother for the evaluation and recommendation of Vision changes.      Started: 3 weeks ago.   Describes: Blur vision.   Timing: still present.   Frequency: intermittent in the last 6 to 7 months.   Pain:  0 to 4/ 10.   Location: right eye.   Family: Autoimmune Ds.   Medications: Prednisone.   Worsen: none.   Alleviated: medications.   Associated symptoms:   No other Neuro symptoms / signs.   Triggers: none.   Prodrome symptoms: none.           Saw Ophthalmology 3 weeks ago - " swelling right Optic nerve ".        Went to the ED and stayed x 3 days on steroids / now continue Prednisone 60 mg daily ( about a week ) / LP - OP was 19.5.         Saw Neuro/ Ophthalmology - Px. Prednisone Sept to November 2023.         MTX tried - by Rheumatologist ( RF mildly elevated )     Review of Systems  Review of Systems   Neurological:         Blur vision.   All other systems reviewed and are negative.    Objective:     Neurologic Exam     Mental Status   Oriented to person, place, and time.   Registration: recalls 3 of 3 objects. Recall at 5 minutes: recalls 3 of 3 objects. Follows 3 step commands.   Attention: normal. Concentration: normal.   Speech: speech is normal   Level of consciousness: alert  Knowledge: good. Able to perform simple calculations.   Able to name object. Able to read. Able to repeat. Able to write. Normal comprehension.     Cranial Nerves     CN II   Visual fields full to confrontation.     CN III, IV, VI   Pupils are equal, round, and reactive to light.  Extraocular motions are normal.   Upgaze: normal  Downgaze: normal  Conjugate gaze: present  Vestibulo-ocular reflex: present    CN V   Facial sensation intact.     CN VII   Facial expression full, symmetric.     CN VIII   CN VIII normal.     CN IX, X   CN IX " normal.   CN X normal.     CN XI   CN XI normal.     CN XII   CN XII normal.     Motor Exam   Muscle bulk: normal  Overall muscle tone: normal    Strength   Strength 5/5 throughout.   Right neck flexion: 5/5  Left neck flexion: 5/5  Right neck extension: 5/5  Left neck extension: 5/5  Right deltoid: 5/5  Left deltoid: 5/5  Right biceps: 5/5  Left biceps: 5/5  Right triceps: 5/5  Left triceps: 5/5  Right wrist flexion: 5/5  Left wrist flexion: 5/5  Right wrist extension: 5/5  Left wrist extension: 5/5  Right interossei: 5/5  Left interossei: 5/5  Right abdominals: 5/5  Left abdominals: 5/5  Right iliopsoas: 5/5  Left iliopsoas: 5/5  Right quadriceps: 5/5  Left quadriceps: 5/5  Right hamstrin/5  Left hamstrin/5  Right glutei: 5/5  Left glutei: 5/5  Right anterior tibial: 5/5  Left anterior tibial: 5/5  Right posterior tibial: 5/5  Left posterior tibial: 5/5  Right peroneal: 5/5  Left peroneal: 5/5  Right gastroc: 5/5  Left gastroc: 5/5    Sensory Exam   Light touch normal.   Vibration normal.   Proprioception normal.   Pinprick normal.   Graphesthesia: normal  Stereognosis: normal    Gait, Coordination, and Reflexes     Gait  Gait: normal    Coordination   Romberg: negative  Finger to nose coordination: normal  Heel to shin coordination: normal  Tandem walking coordination: normal    Tremor   Resting tremor: absent  Intention tremor: absent  Action tremor: absent    Reflexes   Right brachioradialis: 2+  Left brachioradialis: 2+  Right biceps: 2+  Left biceps: 2+  Right triceps: 2+  Left triceps: 2+  Right patellar: 2+  Left patellar: 2+  Right achilles: 2+  Left achilles: 2+  Right : 2+  Left : 2+  Right plantar: normal  Left plantar: normal  Right Fisher: absent  Left Fisher: absent  Right ankle clonus: absent  Left ankle clonus: absent  Right pendular knee jerk: absent  Left pendular knee jerk: absent      Physical Exam  Vitals and nursing note reviewed.   Constitutional:       Appearance: Normal  appearance. She is normal weight.   HENT:      Head: Normocephalic and atraumatic.      Right Ear: Tympanic membrane normal.      Left Ear: Tympanic membrane normal.      Nose: Nose normal.      Mouth/Throat:      Mouth: Mucous membranes are moist.      Pharynx: Oropharynx is clear.   Eyes:      Extraocular Movements: Extraocular movements intact and EOM normal.      Conjunctiva/sclera: Conjunctivae normal.      Pupils: Pupils are equal, round, and reactive to light.   Cardiovascular:      Rate and Rhythm: Normal rate and regular rhythm.      Pulses: Normal pulses.      Heart sounds: Normal heart sounds.   Pulmonary:      Effort: Pulmonary effort is normal.      Breath sounds: Normal breath sounds.   Abdominal:      General: Abdomen is flat. Bowel sounds are normal.      Palpations: Abdomen is soft.   Genitourinary:     Comments: Deferred.   Musculoskeletal:         General: Normal range of motion.      Cervical back: Normal range of motion and neck supple.   Skin:     General: Skin is warm and dry.      Capillary Refill: Capillary refill takes less than 2 seconds.   Neurological:      Mental Status: She is alert and oriented to person, place, and time. Mental status is at baseline.      Motor: Motor strength is normal.     Coordination: Finger-Nose-Finger Test, Heel to Shin Test and Romberg Test normal.      Gait: Gait is intact. Tandem walk normal.      Deep Tendon Reflexes:      Reflex Scores:       Tricep reflexes are 2+ on the right side and 2+ on the left side.       Bicep reflexes are 2+ on the right side and 2+ on the left side.       Brachioradialis reflexes are 2+ on the right side and 2+ on the left side.       Patellar reflexes are 2+ on the right side and 2+ on the left side.       Achilles reflexes are 2+ on the right side and 2+ on the left side.  Psychiatric:         Mood and Affect: Mood normal.         Speech: Speech normal.         Behavior: Behavior normal.         Thought Content: Thought  content normal.         Judgment: Judgment normal.          Assessment:   19 Years old C.Female with PMHX as above came of the evaluation of possible MS.   - Optic Neuritis.   Plan:   Patient Neurological Assessment is non focal. Now on Prednisone doing well.   No other Neuro deficits.   Reviewed MRI Brain / LP with CSF studies - no evidence of MS / NMOSD / no IIH.     Eyes clinic - this coming Friday.  Personally Reviewed MRI / MRV Brain and Orbit - done 03/ 2024 - non significant abnormalities.      My recommendation is to contin ue following with Rheumatology and Neuro Ophthalmology service.    IR LP: done 03/ 2024 - OP 19.5 Cm H2O.  CSF: Cell count / Glucose / Total Protein.  Labs: HIV / OGCL band / Factor 5 / Hgb A 1 C / Syphilis antibodies / CRP / ESR / Homocysteine /    Lupus Anticoagulant / Hep C - done 03/ 2024 - non significant abnormalities.     NMO Aqua porin CSF - negative.     Please do not hesitate to contact me with any updates, questions or concerns.      Renato Boyce MD.  General Neurologist.

## 2024-03-30 LAB — AQP4 H2O CHANNEL IGG CSF QL: NEGATIVE

## 2024-03-31 LAB
BACTERIA CSF CULT: NORMAL
GRAM STN SPEC: NORMAL

## 2024-04-01 LAB
AQP4 H2O CHANNEL IGG SERPL QL: NEGATIVE
IMMUNOLOGIST REVIEW: NORMAL
MOG IGG1 SERPL QL FC: NEGATIVE

## 2024-04-02 ENCOUNTER — OFFICE VISIT (OUTPATIENT)
Dept: NEUROLOGY | Facility: CLINIC | Age: 19
End: 2024-04-02
Payer: COMMERCIAL

## 2024-04-02 VITALS
HEART RATE: 100 BPM | HEIGHT: 68 IN | OXYGEN SATURATION: 99 % | WEIGHT: 262.38 LBS | SYSTOLIC BLOOD PRESSURE: 123 MMHG | BODY MASS INDEX: 39.76 KG/M2 | DIASTOLIC BLOOD PRESSURE: 83 MMHG | RESPIRATION RATE: 16 BRPM

## 2024-04-02 DIAGNOSIS — H46.9 OPTIC NEURITIS: ICD-10-CM

## 2024-04-02 PROCEDURE — 1160F RVW MEDS BY RX/DR IN RCRD: CPT | Mod: CPTII,S$GLB,, | Performed by: PSYCHIATRY & NEUROLOGY

## 2024-04-02 PROCEDURE — 99213 OFFICE O/P EST LOW 20 MIN: CPT | Mod: S$GLB,,, | Performed by: PSYCHIATRY & NEUROLOGY

## 2024-04-02 PROCEDURE — 1111F DSCHRG MED/CURRENT MED MERGE: CPT | Mod: CPTII,S$GLB,, | Performed by: PSYCHIATRY & NEUROLOGY

## 2024-04-02 PROCEDURE — 3008F BODY MASS INDEX DOCD: CPT | Mod: CPTII,S$GLB,, | Performed by: PSYCHIATRY & NEUROLOGY

## 2024-04-02 PROCEDURE — 3044F HG A1C LEVEL LT 7.0%: CPT | Mod: CPTII,S$GLB,, | Performed by: PSYCHIATRY & NEUROLOGY

## 2024-04-02 PROCEDURE — 3079F DIAST BP 80-89 MM HG: CPT | Mod: CPTII,S$GLB,, | Performed by: PSYCHIATRY & NEUROLOGY

## 2024-04-02 PROCEDURE — 3074F SYST BP LT 130 MM HG: CPT | Mod: CPTII,S$GLB,, | Performed by: PSYCHIATRY & NEUROLOGY

## 2024-04-02 PROCEDURE — 1159F MED LIST DOCD IN RCRD: CPT | Mod: CPTII,S$GLB,, | Performed by: PSYCHIATRY & NEUROLOGY

## 2024-04-02 PROCEDURE — 99999 PR PBB SHADOW E&M-EST. PATIENT-LVL V: CPT | Mod: PBBFAC,,, | Performed by: PSYCHIATRY & NEUROLOGY

## 2024-04-02 RX ORDER — FOLIC ACID 1 MG/1
1000 TABLET ORAL
COMMUNITY
Start: 2024-03-28

## 2024-04-05 ENCOUNTER — OFFICE VISIT (OUTPATIENT)
Dept: OPHTHALMOLOGY | Facility: CLINIC | Age: 19
End: 2024-04-05
Payer: COMMERCIAL

## 2024-04-05 VITALS — HEIGHT: 68 IN | BODY MASS INDEX: 39.76 KG/M2 | WEIGHT: 262.38 LBS

## 2024-04-05 DIAGNOSIS — H46.9 OPTIC NEURITIS, RIGHT: Primary | ICD-10-CM

## 2024-04-05 PROCEDURE — 92133 CPTRZD OPH DX IMG PST SGM ON: CPT | Mod: PBBFAC,PN | Performed by: OPHTHALMOLOGY

## 2024-04-05 PROCEDURE — 92133 CPTRZD OPH DX IMG PST SGM ON: CPT | Mod: PBBFAC,PN | Performed by: STUDENT IN AN ORGANIZED HEALTH CARE EDUCATION/TRAINING PROGRAM

## 2024-04-05 PROCEDURE — 99213 OFFICE O/P EST LOW 20 MIN: CPT | Mod: PBBFAC,PN | Performed by: STUDENT IN AN ORGANIZED HEALTH CARE EDUCATION/TRAINING PROGRAM

## 2024-04-05 PROCEDURE — 92083 EXTENDED VISUAL FIELD XM: CPT | Mod: PBBFAC,PN | Performed by: OPHTHALMOLOGY

## 2024-04-05 NOTE — PROGRESS NOTES
HPI     Optic neuritis     Additional comments: Visional loss started about 3 weeks ago    Pain and redness OD started in August.     Maternal Grandfather-rheum. arthritis  Maternal first cousin- Lupus  [place]-            Comments    Optic neuritis, blurry vision           Last edited by Kingston Ward MA on 4/5/2024 12:31 PM.            Assessment /Plan     For exam results, see Encounter Report.    Optic neuritis, right  -     Ambulatory referral/consult to Ophthalmology  -     X-Ray Chest PA And Lateral; Future; Expected date: 04/05/2024  -     Angiotensin Converting Enzyme; Future; Expected date: 04/05/2024  -     B[place] Anitbody Panel; Future; Expected date: 04/05/2024  -     CARDIOLIPIN AB (IGA,IGG,IGM); Future; Expected date: 04/05/2024  -     DRVVT; Future; Expected date: 04/05/2024  -     Protein Electrophoresis, Serum, w/Interp; Future; Expected date: 04/05/2024  -     Homocysteine, Serum; Future; Expected date: 04/05/2024  -     Lyme Disease Ab, Immunoblot; Future; Expected date: 04/05/2024  -     Quantiferon Gold TB; Future; Expected date: 04/05/2024  -     Miscellaneous Test, Sendout Vitamine B1/Thiamine; Future; Expected date: 04/05/2024  -     Miscellaneous Test, Sendout Vitamin B6; Future; Expected date: 04/05/2024  -     Vitamin B12; Future; Expected date: 04/05/2024                 HVF 24-2   4/5/24: OD reliable, dense inf arcuate, SNS // OS unreliable, full    OCT RNFL   4/5/24: 179 white T/N/I, green S // 92, green    Optos 4/5/24: 2+ ONH edema with mild pallor OD // p/s, small cup left eye    1. 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 Had some improvement of symptoms.  - Initial examination 3/23/24: 2 weeks of blurred vision right eye, sudden onset first noticed in the morning without worse pain than usual. No improvement. Started on 60mg PO pred 3/22/24, sent to ED for evaluation.  -Right  eye: 20/800, noel APD. Spared SN visual field, complete loss IN, relative loss ST/IT. 3+ ONH edema with tracking fluid and mild heme. Left eye: unremarkable except <0.1 cup/disc  3/24/24: 20/100 near with eccentric viewing (with great difficulty). Still inf > sup visual field depression, relative ST > SN sparing.   3/25/24: VA much improved, 20/25 near. Improving visual field especially superiorly. MRI negative and good response to steroids. 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 Plan to do 500mg solu medrol today and tomorrow. LP tomorrow then likely discharge pending attending evaluation.  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 VF with strong inferior arcuate defect and superior nasal defect. Left eye unreliable but without gross defect. Only positive on workup has been lipoprotein A, which can be associated with NAION. VF could be consistent with it, but still quite unusual for this age group. She also has had slightly elevated LDL in the past. Unclear what the etiology is at this time. Will order further hypercoagulable workup, along with vitamin and a few others. We will place referral for second opinion with Dr. Eliud Lombardi in [place] and appreciate his assistance.    2nd opinion:  Is this an optic nerve edema due to scleritis?  Is this a second and  different disease.  Next step to prevent disc edema OS.  Should the steroids be stopped and the work up repeated?  She was placed on high dose steroids for many weeks by outside eye doctor before labs were ordered.    Return next week, sooner as needed

## 2024-04-08 ENCOUNTER — HOSPITAL ENCOUNTER (OUTPATIENT)
Dept: RADIOLOGY | Facility: HOSPITAL | Age: 19
Discharge: HOME | End: 2024-04-08
Attending: OPHTHALMOLOGY
Payer: COMMERCIAL

## 2024-04-08 DIAGNOSIS — H46.9 OPTIC NEURITIS, RIGHT: ICD-10-CM

## 2024-04-08 PROCEDURE — 84207 ASSAY OF VITAMIN B-6: CPT

## 2024-04-08 PROCEDURE — 71046 X-RAY EXAM CHEST 2 VIEWS: CPT | Mod: TC

## 2024-04-09 ENCOUNTER — TELEPHONE (OUTPATIENT)
Dept: OPHTHALMOLOGY | Facility: CLINIC | Age: 19
End: 2024-04-09
Payer: COMMERCIAL

## 2024-04-09 NOTE — TELEPHONE ENCOUNTER
Called patient for pre procedure call and spoke with the patient after she verrified her name and . Asked the patient if she had any questions about her IVFA tomorrow and she asked if she could drive after the procedure, I let her know that she needed to have someone  her to the clinic for the procedure. She asked if she could drive to her classes in the afternoon and I informed her that  she could. She also asked what the procedure entailed and I told her that we will check her vision and pressure along with her Blood Pressure and temperature then an IV will be inserted and a vegetable dye will be injected into her vein and then pictures of the back of the eyes will be taken. She stated that she will also need a Dr note explaining her eye condition so that she can receive accommodations in her classes. She stated understanding and that she had no other questions at this time. She will be here for her appointment.

## 2024-04-10 ENCOUNTER — CLINICAL SUPPORT (OUTPATIENT)
Dept: OPHTHALMOLOGY | Facility: CLINIC | Age: 19
End: 2024-04-10
Payer: COMMERCIAL

## 2024-04-10 VITALS — BODY MASS INDEX: 39.76 KG/M2 | WEIGHT: 262.38 LBS | HEIGHT: 68 IN

## 2024-04-10 DIAGNOSIS — H46.9 OPTIC NEURITIS, RIGHT: Primary | ICD-10-CM

## 2024-04-10 PROCEDURE — 99213 OFFICE O/P EST LOW 20 MIN: CPT | Mod: PBBFAC,PN,25 | Performed by: STUDENT IN AN ORGANIZED HEALTH CARE EDUCATION/TRAINING PROGRAM

## 2024-04-10 PROCEDURE — 92235 FLUORESCEIN ANGRPH MLTIFRAME: CPT | Mod: PBBFAC,PN,GC | Performed by: STUDENT IN AN ORGANIZED HEALTH CARE EDUCATION/TRAINING PROGRAM

## 2024-04-10 RX ORDER — PREDNISONE 20 MG/1
40 TABLET ORAL DAILY
Qty: 60 TABLET | Refills: 1 | Status: ON HOLD | OUTPATIENT
Start: 2024-04-10 | End: 2024-04-16

## 2024-04-10 RX ORDER — FLUORESCEIN 500 MG/ML
5 INJECTION INTRAVENOUS
Status: COMPLETED | OUTPATIENT
Start: 2024-04-10 | End: 2024-04-10

## 2024-04-10 RX ORDER — PHENYLEPH/TROPICAMIDE IN WATER 2.5 %-1 %
1 DROPS OPHTHALMIC (EYE) ONCE
Status: COMPLETED | OUTPATIENT
Start: 2024-04-10 | End: 2024-04-10

## 2024-04-10 RX ADMIN — Medication 1 DROP: at 08:04

## 2024-04-10 RX ADMIN — FLUORESCEIN 500 MG: 500 INJECTION INTRAVENOUS at 08:04

## 2024-04-10 NOTE — LETTER
April 10, 2024    Alma Ca  [address] [place] [id number]             [facility]  Ophthalmology  [address]  [place] [address]  Phone: [phone]   April 10, 2024     Patient: Alma Ca   YOB: 2005   Date of Visit: 4/10/2024       To Whom it May Concern:    Alma Ca was seen in my clinic on 4/10/2024. She has been undergoing intensive treatment for an ocular illness. Her right eye sees poorly and this will likely be the case for the foreseeable future. She would benefit from accommodations for low vision including larger print, good lighting, and magnification as needed. Additionally, she will require regular medical evaluation treatment likely through the summer which could interfere with attendance.    If you have any questions or concerns, please don't hesitate to call.    Sincerely,         Frankie Garcia MD

## 2024-04-10 NOTE — PROGRESS NOTES
Assessment /Plan     For exam results, see Encounter Report.    Optic neuritis, right  -     tropicamide /PHENYLephrine opthalmic solution 1 drop    Other orders  -     fluorescein 500 mg/5 mL (10 %) injection 500 mg                       Here for IVFA results    4/10/24:   OD: media clear, window defect circumferentially around nerve 2-3DD. Leakage right nerve, periphery wnl  OS: media clear, staining (likely) nasal nerve, macula and periphery wnl    Will replace order for referral for urgent, currently has an apt with Dr. Lombardi in August but will request sooner.    Lower prednisone to 40mg daily. Likely starting Humira for RF serologies per rheumatology.    Return 3 weeks

## 2024-04-12 LAB — MAYO GENERIC ORDERABLE RESULT: NORMAL

## 2024-04-13 ENCOUNTER — HOSPITAL ENCOUNTER (INPATIENT)
Facility: HOSPITAL | Age: 19
LOS: 2 days | Discharge: HOME | DRG: 125 | End: 2024-04-16
Attending: FAMILY MEDICINE | Admitting: INTERNAL MEDICINE
Payer: COMMERCIAL

## 2024-04-13 DIAGNOSIS — R76.8 POSITIVE ANA (ANTINUCLEAR ANTIBODY): ICD-10-CM

## 2024-04-13 DIAGNOSIS — H46.9 OPTIC NEURITIS: ICD-10-CM

## 2024-04-13 DIAGNOSIS — R07.9 CHEST PAIN: ICD-10-CM

## 2024-04-13 DIAGNOSIS — H54.61 VISION LOSS OF RIGHT EYE: ICD-10-CM

## 2024-04-13 DIAGNOSIS — R00.0 TACHYCARDIA: ICD-10-CM

## 2024-04-13 DIAGNOSIS — H15.031 POSTERIOR SCLERITIS OF RIGHT EYE: Primary | ICD-10-CM

## 2024-04-13 DIAGNOSIS — Z79.52 LONG TERM (CURRENT) USE OF SYSTEMIC STEROIDS: ICD-10-CM

## 2024-04-13 DIAGNOSIS — H15.001 SCLERITIS OF RIGHT EYE: ICD-10-CM

## 2024-04-13 PROCEDURE — 99285 EMERGENCY DEPT VISIT HI MDM: CPT

## 2024-04-13 NOTE — LETTER
April 17, 2024         [address] [facility] [address]  Phone: [phone]  Fax: [phone]       Patient: Alma Ca   YOB: 2005  Date of Visit: 04/17/2024    To Whom It May Concern:    Alondra Ca  was at [facility] from 4/13/2024 until 04/17/2024. The patient may return to work/school on 4/22/2024 with no restrictions. If you have any questions or concerns, or if I can be of further assistance, please do not hesitate to contact me.    Sincerely,    Meenu Caldera RN

## 2024-04-14 LAB
ALBUMIN SERPL-MCNC: 3.7 G/DL (ref 3.5–5)
ALBUMIN/GLOB SERPL: 1 RATIO (ref 1.1–2)
ALP SERPL-CCNC: 52 UNIT/L (ref 40–150)
ALT SERPL-CCNC: 24 UNIT/L (ref 0–55)
AST SERPL-CCNC: 14 UNIT/L (ref 5–34)
BASOPHILS # BLD AUTO: 0.01 X10(3)/MCL
BASOPHILS NFR BLD AUTO: 0.1 %
BILIRUB SERPL-MCNC: 0.4 MG/DL
BUN SERPL-MCNC: 13.2 MG/DL (ref 7–18.7)
CALCIUM SERPL-MCNC: 10.3 MG/DL (ref 8.4–10.2)
CHLORIDE SERPL-SCNC: 106 MMOL/L (ref 98–107)
CO2 SERPL-SCNC: 22 MMOL/L (ref 22–29)
CREAT SERPL-MCNC: 0.89 MG/DL (ref 0.55–1.02)
EOSINOPHIL # BLD AUTO: 0.01 X10(3)/MCL (ref 0–0.9)
EOSINOPHIL NFR BLD AUTO: 0.1 %
ERYTHROCYTE [DISTWIDTH] IN BLOOD BY AUTOMATED COUNT: 17.5 % (ref 11.5–17)
GFR SERPLBLD CREATININE-BSD FMLA CKD-EPI: >60 MLS/MIN/1.73/M2
GLOBULIN SER-MCNC: 3.8 GM/DL (ref 2.4–3.5)
GLUCOSE SERPL-MCNC: 139 MG/DL (ref 74–100)
HCT VFR BLD AUTO: 38 % (ref 37–47)
HGB BLD-MCNC: 12 G/DL (ref 12–16)
HOLD SPECIMEN: NORMAL
IMM GRANULOCYTES # BLD AUTO: 0.1 X10(3)/MCL (ref 0–0.04)
IMM GRANULOCYTES NFR BLD AUTO: 0.8 %
LYMPHOCYTES # BLD AUTO: 1.02 X10(3)/MCL (ref 0.6–4.6)
LYMPHOCYTES NFR BLD AUTO: 7.7 %
MCH RBC QN AUTO: 26.3 PG (ref 27–31)
MCHC RBC AUTO-ENTMCNC: 31.6 G/DL (ref 33–36)
MCV RBC AUTO: 83.3 FL (ref 80–94)
MONOCYTES # BLD AUTO: 0.11 X10(3)/MCL (ref 0.1–1.3)
MONOCYTES NFR BLD AUTO: 0.8 %
NEUTROPHILS # BLD AUTO: 11.97 X10(3)/MCL (ref 2.1–9.2)
NEUTROPHILS NFR BLD AUTO: 90.5 %
NRBC BLD AUTO-RTO: 0 %
PLATELET # BLD AUTO: 379 X10(3)/MCL (ref 130–400)
PMV BLD AUTO: 9.5 FL (ref 7.4–10.4)
POCT GLUCOSE: 121 MG/DL (ref 70–110)
POCT GLUCOSE: 151 MG/DL (ref 70–110)
POCT GLUCOSE: 187 MG/DL (ref 70–110)
POCT GLUCOSE: 198 MG/DL (ref 70–110)
POTASSIUM SERPL-SCNC: 4.2 MMOL/L (ref 3.5–5.1)
PROT SERPL-MCNC: 7.5 GM/DL (ref 6.4–8.3)
RBC # BLD AUTO: 4.56 X10(6)/MCL (ref 4.2–5.4)
SODIUM SERPL-SCNC: 137 MMOL/L (ref 136–145)
WBC # SPEC AUTO: 13.22 X10(3)/MCL (ref 4.5–11.5)

## 2024-04-14 PROCEDURE — 25000003 PHARM REV CODE 250

## 2024-04-14 PROCEDURE — 80053 COMPREHEN METABOLIC PANEL: CPT

## 2024-04-14 PROCEDURE — 87070 CULTURE OTHR SPECIMN AEROBIC: CPT

## 2024-04-14 PROCEDURE — 11000001 HC ACUTE MED/SURG PRIVATE ROOM

## 2024-04-14 PROCEDURE — 25000003 PHARM REV CODE 250: Performed by: INTERNAL MEDICINE

## 2024-04-14 PROCEDURE — 86611 BARTONELLA ANTIBODY: CPT

## 2024-04-14 PROCEDURE — 21400001 HC TELEMETRY ROOM

## 2024-04-14 PROCEDURE — 63600175 PHARM REV CODE 636 W HCPCS

## 2024-04-14 PROCEDURE — 85025 COMPLETE CBC W/AUTO DIFF WBC: CPT

## 2024-04-14 PROCEDURE — 63600175 PHARM REV CODE 636 W HCPCS: Performed by: INTERNAL MEDICINE

## 2024-04-14 RX ORDER — IBUPROFEN 200 MG
16 TABLET ORAL
Status: DISCONTINUED | OUTPATIENT
Start: 2024-04-14 | End: 2024-04-16 | Stop reason: HOSPADM

## 2024-04-14 RX ORDER — SODIUM CHLORIDE 0.9 % (FLUSH) 0.9 %
10 SYRINGE (ML) INJECTION EVERY 12 HOURS PRN
Status: DISCONTINUED | OUTPATIENT
Start: 2024-04-14 | End: 2024-04-16 | Stop reason: HOSPADM

## 2024-04-14 RX ORDER — ACETAMINOPHEN 325 MG/1
650 TABLET ORAL EVERY 8 HOURS PRN
Status: DISCONTINUED | OUTPATIENT
Start: 2024-04-14 | End: 2024-04-16 | Stop reason: HOSPADM

## 2024-04-14 RX ORDER — VALACYCLOVIR HYDROCHLORIDE 500 MG/1
1000 TABLET, FILM COATED ORAL 2 TIMES DAILY
Status: DISCONTINUED | OUTPATIENT
Start: 2024-04-14 | End: 2024-04-16 | Stop reason: HOSPADM

## 2024-04-14 RX ORDER — TALC
6 POWDER (GRAM) TOPICAL NIGHTLY PRN
Status: DISCONTINUED | OUTPATIENT
Start: 2024-04-14 | End: 2024-04-16 | Stop reason: HOSPADM

## 2024-04-14 RX ORDER — GLUCAGON 1 MG
1 KIT INJECTION
Status: DISCONTINUED | OUTPATIENT
Start: 2024-04-14 | End: 2024-04-16 | Stop reason: HOSPADM

## 2024-04-14 RX ORDER — PANTOPRAZOLE SODIUM 40 MG/1
40 TABLET, DELAYED RELEASE ORAL DAILY
Status: DISCONTINUED | OUTPATIENT
Start: 2024-04-14 | End: 2024-04-16 | Stop reason: HOSPADM

## 2024-04-14 RX ORDER — OXYBUTYNIN CHLORIDE 5 MG/1
15 TABLET, EXTENDED RELEASE ORAL DAILY
Status: DISCONTINUED | OUTPATIENT
Start: 2024-04-14 | End: 2024-04-16 | Stop reason: HOSPADM

## 2024-04-14 RX ORDER — LORAZEPAM 2 MG/ML
2 INJECTION INTRAMUSCULAR NIGHTLY PRN
Status: DISCONTINUED | OUTPATIENT
Start: 2024-04-14 | End: 2024-04-14

## 2024-04-14 RX ORDER — IBUPROFEN 200 MG
24 TABLET ORAL
Status: DISCONTINUED | OUTPATIENT
Start: 2024-04-14 | End: 2024-04-16 | Stop reason: HOSPADM

## 2024-04-14 RX ORDER — HEPARIN SODIUM 5000 [USP'U]/ML
5000 INJECTION, SOLUTION INTRAVENOUS; SUBCUTANEOUS EVERY 12 HOURS
Status: DISCONTINUED | OUTPATIENT
Start: 2024-04-14 | End: 2024-04-16 | Stop reason: HOSPADM

## 2024-04-14 RX ORDER — DOXYCYCLINE HYCLATE 100 MG
100 TABLET ORAL EVERY 12 HOURS
Status: DISCONTINUED | OUTPATIENT
Start: 2024-04-14 | End: 2024-04-16 | Stop reason: HOSPADM

## 2024-04-14 RX ORDER — FOLIC ACID 1 MG/1
1 TABLET ORAL DAILY
Status: DISCONTINUED | OUTPATIENT
Start: 2024-04-14 | End: 2024-04-16 | Stop reason: HOSPADM

## 2024-04-14 RX ORDER — SERTRALINE HYDROCHLORIDE 50 MG/1
100 TABLET, FILM COATED ORAL DAILY
Status: DISCONTINUED | OUTPATIENT
Start: 2024-04-14 | End: 2024-04-16 | Stop reason: HOSPADM

## 2024-04-14 RX ORDER — LORAZEPAM 1 MG/1
2 TABLET ORAL EVERY 6 HOURS PRN
Status: DISCONTINUED | OUTPATIENT
Start: 2024-04-14 | End: 2024-04-16 | Stop reason: HOSPADM

## 2024-04-14 RX ORDER — INSULIN ASPART 100 [IU]/ML
0-10 INJECTION, SOLUTION INTRAVENOUS; SUBCUTANEOUS
Status: DISCONTINUED | OUTPATIENT
Start: 2024-04-14 | End: 2024-04-16 | Stop reason: HOSPADM

## 2024-04-14 RX ORDER — FOLIC ACID 1 MG/1
1 TABLET ORAL DAILY
Status: DISCONTINUED | OUTPATIENT
Start: 2024-04-14 | End: 2024-04-14

## 2024-04-14 RX ORDER — NALOXONE HCL 0.4 MG/ML
0.02 VIAL (ML) INJECTION
Status: DISCONTINUED | OUTPATIENT
Start: 2024-04-14 | End: 2024-04-16 | Stop reason: HOSPADM

## 2024-04-14 RX ORDER — DIPHENHYDRAMINE HCL 25 MG
25 CAPSULE ORAL NIGHTLY PRN
Status: DISCONTINUED | OUTPATIENT
Start: 2024-04-14 | End: 2024-04-16 | Stop reason: HOSPADM

## 2024-04-14 RX ADMIN — VALACYCLOVIR 1000 MG: 500 TABLET, FILM COATED ORAL at 09:04

## 2024-04-14 RX ADMIN — HEPARIN SODIUM 5000 UNITS: 5000 INJECTION, SOLUTION INTRAVENOUS; SUBCUTANEOUS at 09:04

## 2024-04-14 RX ADMIN — METHYLPREDNISOLONE SODIUM SUCCINATE 1000 MG: 1 INJECTION, POWDER, LYOPHILIZED, FOR SOLUTION INTRAMUSCULAR; INTRAVENOUS at 01:04

## 2024-04-14 RX ADMIN — SERTRALINE HYDROCHLORIDE 100 MG: 50 TABLET ORAL at 09:04

## 2024-04-14 RX ADMIN — LAMOTRIGINE 125 MG: 25 TABLET ORAL at 09:04

## 2024-04-14 RX ADMIN — DIPHENHYDRAMINE HYDROCHLORIDE 25 MG: 25 CAPSULE ORAL at 09:04

## 2024-04-14 RX ADMIN — LORAZEPAM 2 MG: 1 TABLET ORAL at 10:04

## 2024-04-14 RX ADMIN — METHYLPREDNISOLONE SODIUM SUCCINATE 1000 MG: 1 INJECTION, POWDER, LYOPHILIZED, FOR SOLUTION INTRAMUSCULAR; INTRAVENOUS at 10:04

## 2024-04-14 RX ADMIN — LORAZEPAM 2 MG: 2 INJECTION INTRAMUSCULAR; INTRAVENOUS at 02:04

## 2024-04-14 RX ADMIN — FOLIC ACID 1 MG: 1 TABLET ORAL at 09:04

## 2024-04-14 RX ADMIN — DIPHENHYDRAMINE HYDROCHLORIDE 25 MG: 25 CAPSULE ORAL at 01:04

## 2024-04-14 RX ADMIN — PANTOPRAZOLE SODIUM 40 MG: 40 TABLET, DELAYED RELEASE ORAL at 09:04

## 2024-04-14 RX ADMIN — DOXYCYCLINE HYCLATE 100 MG: 100 TABLET, COATED ORAL at 09:04

## 2024-04-14 RX ADMIN — OXYBUTYNIN CHLORIDE 15 MG: 5 TABLET, EXTENDED RELEASE ORAL at 09:04

## 2024-04-14 NOTE — PLAN OF CARE
Problem: Adult Inpatient Plan of Care  Goal: Plan of Care Review  4/14/2024 1627 by Jane Gama RN  Outcome: Ongoing, Progressing  4/14/2024 1627 by Jane Gama RN  Outcome: Ongoing, Progressing  Goal: Patient-Specific Goal (Individualized)  4/14/2024 1627 by Jane Gama RN  Outcome: Ongoing, Progressing  4/14/2024 1627 by Jane Gama RN  Outcome: Ongoing, Progressing  Goal: Absence of Hospital-Acquired Illness or Injury  4/14/2024 1627 by Jane Gama RN  Outcome: Ongoing, Progressing  4/14/2024 1627 by Jane Gama RN  Outcome: Ongoing, Progressing  Goal: Optimal Comfort and Wellbeing  4/14/2024 1627 by Jane Gama RN  Outcome: Ongoing, Progressing  4/14/2024 1627 by Jane Gama RN  Outcome: Ongoing, Progressing  Goal: Readiness for Transition of Care  4/14/2024 1627 by Jane Gama RN  Outcome: Ongoing, Progressing  4/14/2024 1627 by Jane Gama RN  Outcome: Ongoing, Progressing     Problem: Bariatric Environmental Safety  Goal: Safety Maintained with Care  4/14/2024 1627 by Jane Gama RN  Outcome: Ongoing, Progressing  4/14/2024 1627 by Jane Gama RN  Outcome: Ongoing, Progressing     Problem: Fall Injury Risk  Goal: Absence of Fall and Fall-Related Injury  4/14/2024 1627 by Jane Gama RN  Outcome: Ongoing, Progressing  4/14/2024 1627 by Jane Gama RN  Outcome: Ongoing, Progressing

## 2024-04-14 NOTE — CONSULTS
"Consultation Report  Ophthalmology Service    Date: 04/14/2024    Chief complaint/Reason for Consult: "Decreased vision right eye"     History of Present Illness: Alma Ca is a 19 y.o. female w/ Complex ocular Hx (see below/previous notes) who presents with 1 day of decreased vision OD. Patient reports a few hours of decreased vision OD that began on the evening of 4/13. Currently on PO prednisone 40mg daily. Denies any associated eye pain/pain with eye movement, flashes, floaters, curtains/veils over vision, or any other associated ocular/systemic sx.     POcularHx:   August: crusting and redness of right eye, tx with abx drops, did not improve  Sept-Nov: treated by optom for redness, pain that woke patient up. Up to 100mg prednisone a day which helped but could not successfully taper down  Dec-Jan: seen by Snehal, started on Brimonidine  Jan-Feb: seen by rheumatology, negative workup, started on MTX 6 pills per week  Feb-March: seen by Jose R, treated with Valtrex with some improvement, off steroids  March ~21st: 2 weeks of blurred vision with sudden onset noticed first thing in the morning also with blue photopsias. No change since. Otherwise symptoms stable.    Current eye gtts: Denies     Family Hx: Denies family history of glaucoma, macular degeneration, or blindness. family history includes Arthritis in her maternal grandfather, maternal grandmother, and mother; COPD in her paternal grandfather; Depression in her mother; Drug abuse in her maternal grandfather; Lupus in her maternal cousin; Mental illness in her mother; Miscarriages / Stillbirths in her mother; No Known Problems in her brother, father, and sister; Rheum arthritis in her maternal grandfather.     PMHx:  has a past medical history of ADHD (attention deficit hyperactivity disorder), Anxiety, Bipolar disorder, unspecified, Depression, and Hyperhidrosis.     PSurgHx:  has a past surgical history that includes Tonsillectomy and " Adenoidectomy.     Home Medications:   Prior to Admission medications    Medication Sig Start Date End Date Taking? Authorizing Provider   BIOTIN ORAL Take 200 mg by mouth once daily.    Provider, Historical   folic acid (FOLVITE) 1 MG tablet Take 1,000 mcg by mouth. 3/28/24   Provider, Historical   guanfacine HCl (INTUNIV ER ORAL) Intuniv ER Take No date recorded No form recorded No frequency recorded No route recorded No set duration recorded No set duration amount recorded active No dosage strength recorded No dosage strength units of measure recorded    Provider, Historical   ibuprofen (ADVIL,MOTRIN) 200 MG tablet Take 400 mg by mouth 2 (two) times daily as needed for Pain.    Provider, Historical   lamoTRIgine (LAMICTAL) 25 MG tablet Take 125 mg by mouth every evening. 3/14/23   Provider, Historical   melatonin (MELATIN) 3 mg tablet Take 5 mg by mouth nightly as needed for Insomnia.    Provider, Historical   methotrexate 2.5 MG Tab Take 7.5 mg by mouth every 7 days.    Provider, Historical   oxybutynin (DITROPAN XL) 15 MG TR24 Take 1 tablet (15 mg total) by mouth once daily. 3/26/24 3/26/25  Ryan Garcia MD   predniSONE (DELTASONE) 20 MG tablet Take 2 tablets (40 mg total) by mouth once daily. 4/10/24 6/9/24  Fraknie Garcia MD   sertraline (ZOLOFT) 50 MG tablet Take 100 mg by mouth once daily. 6/1/23   Provider, Historical   valACYclovir (VALTREX) 1000 MG tablet Take 1,000 mg by mouth 2 (two) times daily.    Provider, Historical        Medications this encounter:   Current Facility-Administered Medications   Medication Dose Route Frequency Provider Last Rate Last Admin    acetaminophen tablet 650 mg  650 mg Oral Q8H PRN Stroda, Ajay, DO        dextrose 10% bolus 125 mL 125 mL  12.5 g Intravenous PRN Stroda, Ajay, DO        dextrose 10% bolus 250 mL 250 mL  25 g Intravenous PRN Stroda, Ajay, DO        diphenhydrAMINE capsule 25 mg  25 mg Oral Nightly PRN Stroda, Ajay, DO        doxycycline tablet 100 mg  100  mg Oral Q12H Stroda, Ajay, DO        glucagon (human recombinant) injection 1 mg  1 mg Intramuscular PRN Stroda, Ajay, DO        glucose chewable tablet 16 g  16 g Oral PRN Stroda, Ajay, DO        glucose chewable tablet 24 g  24 g Oral PRN Stroda, Ajay, DO        heparin (porcine) injection 5,000 Units  5,000 Units Subcutaneous Q12H Stroda, Ajay, DO        insulin aspart U-100 injection 0-10 Units  0-10 Units Subcutaneous QID (AC + HS) PRN Stroda, Ajay, DO        LORazepam injection 2 mg  2 mg Intravenous Nightly PRN Stroda, Ajay, DO        melatonin tablet 6 mg  6 mg Oral Nightly PRN Stroda, Ajay, DO        methylPREDNISolone sodium succinate (SOLU-MEDROL) 1,000 mg in dextrose 5 % (D5W) 100 mL IVPB  1,000 mg Intravenous Q24H Stroda, Ajay, DO        naloxone 0.4 mg/mL injection 0.02 mg  0.02 mg Intravenous PRN Stroda, Ajay, DO        sodium chloride 0.9% flush 10 mL  10 mL Intravenous Q12H PRN Stroda, Ajay, DO         Current Outpatient Medications   Medication Sig Dispense Refill    BIOTIN ORAL Take 200 mg by mouth once daily.      folic acid (FOLVITE) 1 MG tablet Take 1,000 mcg by mouth.      guanfacine HCl (INTUNIV ER ORAL) Intuniv ER Take No date recorded No form recorded No frequency recorded No route recorded No set duration recorded No set duration amount recorded active No dosage strength recorded No dosage strength units of measure recorded      ibuprofen (ADVIL,MOTRIN) 200 MG tablet Take 400 mg by mouth 2 (two) times daily as needed for Pain.      lamoTRIgine (LAMICTAL) 25 MG tablet Take 125 mg by mouth every evening.      melatonin (MELATIN) 3 mg tablet Take 5 mg by mouth nightly as needed for Insomnia.      methotrexate 2.5 MG Tab Take 7.5 mg by mouth every 7 days.      oxybutynin (DITROPAN XL) 15 MG TR24 Take 1 tablet (15 mg total) by mouth once daily. 90 tablet 3    predniSONE (DELTASONE) 20 MG tablet Take 2 tablets (40 mg total) by mouth once daily. 60 tablet 1    sertraline (ZOLOFT) 50 MG tablet  Take 100 mg by mouth once daily.      valACYclovir (VALTREX) 1000 MG tablet Take 1,000 mg by mouth 2 (two) times daily.         Allergies: has No Known Allergies.     Social:  reports that she has been smoking cigarettes. She has never used smokeless tobacco. She reports current alcohol use of about 1.0 standard drink of alcohol per week. She reports that she does not use drugs.     ROS: As per HPI    Ocular examination/Dilated fundus examination:  Base Eye Exam       Visual Acuity (Snellen - Linear)         Right Left    Dist sc CF @ 5ft 20/20              Tonometry (Tonopen, 12:03 AM)         Right Left    Pressure 17 15              Pupils         Dark Light Shape React APD    Right 6 4 Round Brisk 2+    Left 6 4 Round Brisk None              Visual Fields         Right Left      Full              Extraocular Movement         Right Left     Full Full              Neuro/Psych       Oriented x3: Yes    Mood/Affect: Normal              Dilation       Both eyes: 1% Mydriacyl, 2.5% Phenylephrine @ 12:05 AM                  Slit Lamp and Fundus Exam       External Exam         Right Left    External Normal Normal              Slit Lamp Exam         Right Left    Lids/Lashes Normal Normal    Conjunctiva/Sclera 1+ inj, worst inf White and quiet    Cornea Clear Clear    Anterior Chamber Deep and quiet Deep and quiet    Iris round, sluggish Round and reactive    Lens Clear Clear    Anterior Vitreous Normal Normal              Fundus Exam         Right Left    Disc grade 2 onh edema Pink/sharp    C/D Ratio  <0.1    Macula faint linear hyperreflective exudates nasal to fovea Flat    Vessels Normal Normal    Periphery Flat w/o heme/tear/detach Flat w/o heme/tear/detach                      Assessment/Plan:     [facility] 24-2              4/5/24: OD reliable, dense inf arcuate, SNS // OS unreliable, full     OCT RNFL              4/5/24: 179 white T/N/I, green S // 92, green     Optos 4/5/24: 2+ ONH edema with mild pallor OD // p/s,  small cup left eye    4/10/24:   OD: media clear, window defect circumferentially around nerve 2-3DD. Leakage right nerve, periphery wnl  OS: media clear, staining (likely) nasal nerve, macula and periphery wnl     1. 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 Had some improvement of symptoms.  - Initial examination 3/23/24: 2 weeks of blurred vision right eye, sudden onset first noticed in the morning without worse pain than usual. No improvement. Started on 60mg PO pred 3/22/24, sent to ED for evaluation.  -Right eye: 20/800, noel APD. Spared SN visual field, complete loss IN, relative loss ST/IT. 3+ ONH edema with tracking fluid and mild heme. Left eye: unremarkable except <0.1 cup/disc  3/24/24: 20/100 near with eccentric viewing (with great difficulty). Still inf > sup visual field depression, relative ST > SN sparing.   3/25/24: VA much improved, 20/25 near. Improving visual field especially superiorly. MRI negative and good response to steroids. 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 Plan to do 500mg solu medrol today and tomorrow. LP tomorrow then likely discharge pending attending evaluation.  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 VF with strong  inferior arcuate defect and superior nasal defect. Left eye unreliable but without gross defect. Only positive on workup has been lipoprotein A, which can be associated with NAION. VF could be consistent with it, but still quite unusual for this age group. She also has had slightly elevated LDL in the past. Unclear what the etiology is at this time. Will order further hypercoagulable workup, along with vitamin and a few others. We will place referral for second opinion with Dr. Eliud Lombardi in [place] and appreciate his assistance.     2nd opinion:  Is this an optic nerve edema due to scleritis?  Is this a second and different disease.  Next step to prevent disc edema OS.  Should the steroids be stopped and the work up repeated?  She was placed on high dose steroids for many weeks by outside eye doctor before labs were ordered.    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       Bry Hameed MD  [facility] Ophthalmology, PGY-3  04/14/2024  11:39 PM

## 2024-04-14 NOTE — PLAN OF CARE
04/14/24 1014   Discharge Assessment   Assessment Type Discharge Planning Assessment   Confirmed/corrected address, phone number and insurance Yes   Confirmed Demographics Correct on Facesheet   Source of Information health record   When was your last doctors appointment?   (PCP: Jayda Drew)   Does patient/caregiver understand observation status   (Inpatient)   Communicated MANDEEP with patient/caregiver Date not available/Unable to determine   Reason For Admission Chest pain; optic neuritis   People in Home sibling(s);parent(s)   Facility Arrived From: Home   Do you expect to return to your current living situation? Yes   Do you have help at home or someone to help you manage your care at home? Yes   Who are your caregiver(s) and their phone number(s)? Caitlyn Ca, mother, P: [phone]   Prior to hospitilization cognitive status: Alert/Oriented;No Deficits   Current cognitive status: Alert/Oriented;No Deficits   Walking or Climbing Stairs Difficulty no   Dressing/Bathing Difficulty no   Home Accessibility stairs within home   Number of Stairs, Within Home, Primary other (see comments)  (20)   Home Layout Bathroom on 2nd floor;Bedroom on 2nd floor   Equipment Currently Used at Home none   Readmission within 30 days? Yes   Patient currently being followed by outpatient case management? No   Do you currently have service(s) that help you manage your care at home? No   Do you take prescription medications? Yes  (24 hour [facility] on [address])   Do you have prescription coverage? Yes   Coverage BCBS   Do you have any problems affording any of your prescribed medications? No   Is the patient taking medications as prescribed? yes   Who is going to help you get home at discharge? Family   How do you get to doctors appointments? car, drives self;family or friend will provide   Are you on dialysis? No   Do you take coumadin? 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

## 2024-04-14 NOTE — PLAN OF CARE
Interval Update/Plan of Care Note:     HPI:  Patient seen today, no changes overnight.     Recommendations:  - Consider repeat MRI of Brain/Orbits with Contrast, fat suppression, and thin slices  - Continue PO Doxycycline 100mg BID to cover for Bartonella/Neuroretinitis   - Continue 1g IV Methylpred x3days (See previous notes for pre-treatment/reaction to IV steroids on previous admission)  - Start GI ppx to avoid gastric ulcers (PPI/H2 antagonist)   - Repeat Bartonella [place] Ab panel   - Routine Methotrexate labs per Rheumatologist (pt reports she typically has screening labs completed in the [facility] system for outside rheumatologist)     Ophthalmology will continue to follow while inpatient. Please contact for any new/worsening visual concerns. Patient/plan discussed with Dr Dudley.     Bry Hameed MD  [employer] [facility]hthal[facility], PGY-3

## 2024-04-14 NOTE — H&P
History and Physical     Date of Admit: 4/13/2024  Current Hospital Day: 2     Subjective:      Brief HPI:  Alma Ca is a 19 y.o. female who  has a past medical history of ADHD (attention deficit hyperactivity disorder), Anxiety, Bipolar disorder, unspecified, Depression, and Hyperhidrosis.  She  is presenting to the emergency department with a 1 day history of decreased vision of her right eye.  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  There was suspicion that it may be related to a rheumatologic etiology and patient has been on prednisone 60 mg until 3 days ago the dose was reduced to 40 mg.  She has also been on methotrexate.  Describes her vision change as a a gray Haze.  She denies any pain with eye movement, flashes, floaters, curtain/fails,, fever, chills, N/V.      POcularHx:   August: crusting and redness of right eye, tx with abx drops, did not improve  Sept-Nov: treated by optom for redness, pain that woke patient up. Up to 100mg prednisone a day which helped but could not successfully taper down  Dec-Jan: seen by Snehal, started on Brimonidine  Jan-Feb: seen by rheumatology, negative workup, started on MTX 6 pills per week  Feb-March: seen by Jose R, treated with Valtrex with some improvement, off steroids  March ~21st: 2 weeks of blurred vision with sudden onset noticed first thing in the morning also with blue photopsias. No change since. Otherwise symptoms stable.        Past Medical History:   Diagnosis Date    ADHD (attention deficit hyperactivity disorder)     Anxiety     Bipolar disorder, unspecified     Depression     Hyperhidrosis        Past Surgical History:   Procedure Laterality Date    ADENOIDECTOMY      TONSILLECTOMY         Review of patient's allergies indicates:  No Known Allergies    Current Outpatient Medications   Medication Instructions    BIOTIN ORAL 200 mg, Oral, Daily    folic  acid (FOLVITE) 1,000 mcg, Oral    guanfacine HCl (INTUNIV ER ORAL) Intuniv ER Take No date recorded No form recorded No frequency recorded No route recorded No set duration recorded No set duration amount recorded active No dosage strength recorded No dosage strength units of measure recorded    ibuprofen (ADVIL,MOTRIN) 400 mg, Oral, 2 times daily PRN    lamoTRIgine (LAMICTAL) 125 mg, Oral, Nightly    melatonin (MELATIN) 5 mg, Oral, Nightly PRN    methotrexate 7.5 mg, Oral, Every 7 days    oxybutynin (DITROPAN XL) 15 mg, Oral, Daily    predniSONE (DELTASONE) 40 mg, Oral, Daily    sertraline (ZOLOFT) 100 mg, Oral, Daily    valACYclovir (VALTREX) 1,000 mg, Oral, 2 times daily        Family History       Problem Relation (Age of Onset)    Arthritis Mother, Maternal Grandmother, Maternal Grandfather    COPD Paternal Grandfather    Depression Mother    Drug abuse Maternal Grandfather    Lupus Maternal Cousin    Mental illness Mother    Miscarriages / Stillbirths Mother    No Known Problems Father, Sister, Brother    Rheum arthritis Maternal Grandfather            Tobacco Use    Smoking status: Some Days     Current packs/day: 0.02     Types: Cigarettes    Smokeless tobacco: Never   Substance and Sexual Activity    Alcohol use: Yes     Alcohol/week: 1.0 standard drink of alcohol     Types: 1 Glasses of wine per week     Comment: socially    Drug use: Never    Sexual activity: Yes     Partners: Female        Review of Systems:  Review of Systems   Constitutional:  Negative for chills and fever.   Eyes:  Negative for double vision, photophobia, pain, discharge and redness.        Change in vision of right eye   Respiratory:  Negative for cough.    Cardiovascular:  Negative for chest pain and palpitations.   Gastrointestinal:  Negative for abdominal pain, nausea and vomiting.   Genitourinary:  Negative for flank pain and hematuria.   Skin:  Negative for itching and rash.   Neurological:  Negative for weakness  "and headaches.   Endo/Heme/Allergies:  Does not bruise/bleed easily.          Objective:     Vital Signs:  Vital Signs (Most Recent):  Temp: 98.4 °F (36.9 °C) (04/13/24 2149)  Pulse: 80 (04/13/24 2149)  Resp: 18 (04/13/24 2149)  BP: 120/89 (04/13/24 2149)  SpO2: 100 % (04/13/24 2149) Vital Signs (24h Range):  Temp:  [98.4 °F (36.9 °C)] 98.4 °F (36.9 °C)  Pulse:  [80] 80  Resp:  [18] 18  SpO2:  [100 %] 100 %  BP: (120)/(89) 120/89   Body mass index is 39.96 kg/m².   No intake or output data in the 24 hours ending 04/14/24 0040    Physical Examination:  General:  Well developed, well nourished, no acute distress  Head: NCAT. MMM.  Eyes:  See ophthalmology note for evaluation.  Neck: supple, normal ROM  CVS:  RRR. S1 and S2 normal. No murmurs, rubs, or gallops. Regular peripheral pulses. Resp:  Lungs clear to auscultation bilaterally, no wheezes, rales, or rhonchi. Normal respiratory effort.  GI:  Abdomen soft, non-tender, non-distended, normoactive bowel sounds  MSK:  Full range of motion, no obvious deformities  Skin:  No rashes, ulcers, erythema  Neuro:  Alert and oriented x3, No focal neuro deficits, CNII-XII grossly intact      Laboratory:    No results for input(s): "WBC", "HGB", "HCT", "PLT", "MCV", "RDW" in the last 24 hours.  No results for input(s): "TROPONINI", "CKTOTAL", "CKMB", "BNP" in the last 24 hours.  No results for input(s): "TROPONINI", "CKTOTAL", "CKMB", "BNP" in the last 168 hours.  No results for input(s): "CHOL", "HDL", "LDLCALC", "TRIG", "CHOLHDL" in the last 168 hours. No results for input(s): "NA", "K", "CL", "CO2", "BUN", "CREATININE", "GLU", "CALCIUM", "MG", "PHOS" in the last 24 hours.  No results for input(s): "PROT", "ALBUMIN", "BILITOT", "AST", "ALKPHOS", "ALT" in the last 24 hours.  Recent Labs   Lab 04/08/24  0718   [id number]     No results for input(s): "TSH", "F5TVVXH", "HGBA1C", "INR", "PROTIME", "PTT" in the last 168 hours.       Microbiology Data:  Microbiology Results (last " 7 days)       ** No results found for the last 168 hours. **             Other Results:    Radiology:  Imaging Results    None       No results found in the last 24 hours.     Current Medications:     Infusions:  No current facility-administered medications for this encounter.     Current Outpatient Medications   Medication Sig Dispense Refill    BIOTIN ORAL Take 200 mg by mouth once daily.      folic acid (FOLVITE) 1 MG tablet Take 1,000 mcg by mouth.      guanfacine HCl (INTUNIV ER ORAL) Intuniv ER Take No date recorded No form recorded No frequency recorded No route recorded No set duration recorded No set duration amount recorded active No dosage strength recorded No dosage strength units of measure recorded      ibuprofen (ADVIL,MOTRIN) 200 MG tablet Take 400 mg by mouth 2 (two) times daily as needed for Pain.      lamoTRIgine (LAMICTAL) 25 MG tablet Take 125 mg by mouth every evening.      melatonin (MELATIN) 3 mg tablet Take 5 mg by mouth nightly as needed for Insomnia.      methotrexate 2.5 MG Tab Take 7.5 mg by mouth every 7 days.      oxybutynin (DITROPAN XL) 15 MG TR24 Take 1 tablet (15 mg total) by mouth once daily. 90 tablet 3    predniSONE (DELTASONE) 20 MG tablet Take 2 tablets (40 mg total) by mouth once daily. 60 tablet 1    sertraline (ZOLOFT) 50 MG tablet Take 100 mg by mouth once daily.      valACYclovir (VALTREX) 1000 MG tablet Take 1,000 mg by mouth 2 (two) times daily.           Scheduled:  No current facility-administered medications for this encounter.     Current Outpatient Medications   Medication Sig Dispense Refill    BIOTIN ORAL Take 200 mg by mouth once daily.      folic acid (FOLVITE) 1 MG tablet Take 1,000 mcg by mouth.      guanfacine HCl (INTUNIV ER ORAL) Intuniv ER Take No date recorded No form recorded No frequency recorded No route recorded No set duration recorded No set duration amount recorded active No dosage strength recorded No dosage strength units of  measure recorded      ibuprofen (ADVIL,MOTRIN) 200 MG tablet Take 400 mg by mouth 2 (two) times daily as needed for Pain.      lamoTRIgine (LAMICTAL) 25 MG tablet Take 125 mg by mouth every evening.      melatonin (MELATIN) 3 mg tablet Take 5 mg by mouth nightly as needed for Insomnia.      methotrexate 2.5 MG Tab Take 7.5 mg by mouth every 7 days.      oxybutynin (DITROPAN XL) 15 MG TR24 Take 1 tablet (15 mg total) by mouth once daily. 90 tablet 3    predniSONE (DELTASONE) 20 MG tablet Take 2 tablets (40 mg total) by mouth once daily. 60 tablet 1    sertraline (ZOLOFT) 50 MG tablet Take 100 mg by mouth once daily.      valACYclovir (VALTREX) 1000 MG tablet Take 1,000 mg by mouth 2 (two) times daily.           PRN:  No current facility-administered medications for this encounter.     Current Outpatient Medications   Medication Sig Dispense Refill    BIOTIN ORAL Take 200 mg by mouth once daily.      folic acid (FOLVITE) 1 MG tablet Take 1,000 mcg by mouth.      guanfacine HCl (INTUNIV ER ORAL) Intuniv ER Take No date recorded No form recorded No frequency recorded No route recorded No set duration recorded No set duration amount recorded active No dosage strength recorded No dosage strength units of measure recorded      ibuprofen (ADVIL,MOTRIN) 200 MG tablet Take 400 mg by mouth 2 (two) times daily as needed for Pain.      lamoTRIgine (LAMICTAL) 25 MG tablet Take 125 mg by mouth every evening.      melatonin (MELATIN) 3 mg tablet Take 5 mg by mouth nightly as needed for Insomnia.      methotrexate 2.5 MG Tab Take 7.5 mg by mouth every 7 days.      oxybutynin (DITROPAN XL) 15 MG TR24 Take 1 tablet (15 mg total) by mouth once daily. 90 tablet 3    predniSONE (DELTASONE) 20 MG tablet Take 2 tablets (40 mg total) by mouth once daily. 60 tablet 1    sertraline (ZOLOFT) 50 MG tablet Take 100 mg by mouth once daily.      valACYclovir (VALTREX) 1000 MG tablet Take 1,000 mg by mouth 2 (two) times daily.           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  He was being treated for scleritis since August last year with varying amounts of oral prednisone and Valtrex.  - Recommendations per ophthalmology team initiate high-dose methylprednisolone 1 g daily for 3 days, as well as doxycycline 100 mg b.i.d..  - patient had activating reaction and development of rash with methylprednisolone that was given previously.  For this we will pre treat with 25 mg Benadryl.  will also place 2 mg p.r.n. Ativan for steroid induced insomnia.  - day team can consider repeating MRI of brain/orbit with contrast, fat suppression, and thin slices.  - contact Ophthalmology for any new/worsening visual changes or concerns.  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      Disposition:  Stable.  Admitted for IV steroids and Ophthalmology evaluations.        Ajay Luong DO  Internal Medicine Resident PGY-II      Attending addendum to follow

## 2024-04-14 NOTE — ED PROVIDER NOTES
Encounter Date: 4/13/2024       History     Chief Complaint   Patient presents with    Loss of Vision     Pt arrives with c/o vision loss to her right eye prior to arrival; pt follows with the optho clinic and called the on call number and was told to come to the ED      19-year-old female presents emergency room complaints of decreased vision in right eye.  Patient reports a history of anterior scleritis as well as optic nerve inflammation.  She was currently being followed closely by ophthalmology.  Reports that symptoms likely related to a rheumatologic cause.  Patient has been on prednisone, recently decreased to a dose of 40 mg daily.  Patient began having decreased vision in her right eye, now very blurry when trying to read.  Patient was informed if she had any changes in her vision that she come the emergency room for evaluation.  Patient denies any pain of her right eye.    The history is provided by the patient.     Review of patient's allergies indicates:  No Known Allergies  Past Medical History:   Diagnosis Date    ADHD (attention deficit hyperactivity disorder)     Anxiety     Bipolar disorder, unspecified     Depression     Hyperhidrosis      Past Surgical History:   Procedure Laterality Date    ADENOIDECTOMY      TONSILLECTOMY       Family History   Problem Relation Name Age of Onset    Arthritis Mother Caitlyn     Depression Mother Caitlyn     Mental illness Mother Caitlyn     Miscarriages / Stillbirths Mother Caitlyn     No Known Problems Father      No Known Problems Sister      No Known Problems Brother      Arthritis Maternal Grandmother      Arthritis Maternal Grandfather Fco     Drug abuse Maternal Grandfather Fco     Rheum arthritis Maternal Grandfather Fco     COPD Paternal Grandfather Fco     Lupus Maternal Cousin       Social History     Tobacco Use    Smoking status: Some Days     Current packs/day: 0.02     Types: Cigarettes    Smokeless tobacco: Never   Substance Use Topics     Alcohol use: Yes     Alcohol/week: 1.0 standard drink of alcohol     Types: 1 Glasses of wine per week     Comment: socially    Drug use: Never     Review of Systems   Constitutional:  Negative for chills, fatigue and fever.   HENT:  Negative for ear pain, rhinorrhea and sore throat.    Eyes:  Negative for photophobia and pain.   Respiratory:  Negative for cough, shortness of breath and wheezing.    Cardiovascular:  Negative for chest pain.   Gastrointestinal:  Negative for abdominal pain, diarrhea, nausea and vomiting.   Genitourinary:  Negative for dysuria.   Neurological:  Negative for dizziness, weakness and headaches.   All other systems reviewed and are negative.      Physical Exam     Initial Vitals [04/13/24 2149]   BP Pulse Resp Temp SpO2   120/89 80 18 98.4 °F (36.9 °C) 100 %      MAP       --         Physical Exam    Nursing note and vitals reviewed.  Constitutional: She appears well-developed and well-nourished. No distress.   HENT:   Head: Normocephalic and atraumatic.   Mouth/Throat: Oropharynx is clear and moist.   Eyes: Conjunctivae and EOM are normal. Pupils are equal, round, and reactive to light.   Neck: Neck supple.   Normal range of motion.  Cardiovascular:  Normal rate, regular rhythm and normal heart sounds.           Pulmonary/Chest: No respiratory distress. She has no wheezes. She has no rhonchi. She has no rales.   Abdominal: Abdomen is soft. Bowel sounds are normal. She exhibits no distension. There is no abdominal tenderness. There is no rebound and no guarding.   Musculoskeletal:         General: Normal range of motion.      Cervical back: Normal range of motion and neck supple.     Neurological: She is alert and oriented to person, place, and time.   Skin: Skin is warm and dry. Capillary refill takes less than 2 seconds. No erythema.   Psychiatric: She has a normal mood and affect. Her behavior is normal. Judgment and thought content normal.         ED Course   Procedures  Labs Reviewed  - No data to display       Imaging Results    None          Medications   sodium chloride 0.9% flush 10 mL (has no administration in time range)   acetaminophen tablet 650 mg (has no administration in time range)   naloxone 0.4 mg/mL injection 0.02 mg (has no administration in time range)   glucose chewable tablet 16 g (has no administration in time range)   glucose chewable tablet 24 g (has no administration in time range)   glucagon (human recombinant) injection 1 mg (has no administration in time range)   heparin (porcine) injection 5,000 Units (has no administration in time range)   insulin aspart U-100 injection 0-10 Units (has no administration in time range)   melatonin tablet 6 mg (has no administration in time range)   dextrose 10% bolus 125 mL 125 mL (has no administration in time range)   dextrose 10% bolus 250 mL 250 mL (has no administration in time range)   doxycycline tablet 100 mg (has no administration in time range)   methylPREDNISolone (SOLU-MEDROL) 1,000 mg in dextrose 5 % (D5W) 100 mL IVPB (0 mg Intravenous Stopped 4/14/24 0214)   diphenhydrAMINE capsule 25 mg (25 mg Oral Given 4/14/24 0141)   LORazepam injection 2 mg (2 mg Intravenous Given 4/14/24 0218)   lamoTRIgine tablet 125 mg (125 mg Oral Not Given 4/14/24 0200)   oxybutynin 24 hr tablet 15 mg (has no administration in time range)   sertraline tablet 100 mg (has no administration in time range)   valACYclovir tablet 1,000 mg (has no administration in time range)   folic acid tablet 1 mg (has no administration in time range)     Medical Decision Making  19-year-old female presents emergency room with complaints of decreased vision in her right eye.  On review of the medical record, patient was saw ophthalmology on 04/10/2024.  History of optic neuritis.  Prednisone dosing diminished to 40 mg daily.  Will notify ophthalmology on-call to come and evaluate the patient.  Patient currently appears in no acute distress, pleasant,  nontoxic-appearing.    Risk  Decision regarding hospitalization.               ED Course as of 04/14/24 0557   Sat Apr 13, 2024   2214 Bry Hameed MD with Ophthalmology called - will come to evaluate the patient. [MW]   Sun Apr 14, 2024   0002 Ophthalmology has seen evaluated the patient.  Please see consultation note.  Recommended the patient be admitted for IV steroids due to concerns of the optic neuritis.  IM consulted for admission. [MW]      ED Course User Index  [MW] John Moctezuma MD                           Clinical Impression:  Final diagnoses:  [H46.9] Optic neuritis (Primary)          ED Disposition Condition    Admit                 John Moctezuma MD  04/14/24 0557

## 2024-04-15 PROBLEM — H15.001 SCLERITIS OF RIGHT EYE: Status: ACTIVE | Noted: 2024-04-15

## 2024-04-15 PROBLEM — Z79.52 LONG TERM (CURRENT) USE OF SYSTEMIC STEROIDS: Status: ACTIVE | Noted: 2024-04-15

## 2024-04-15 PROBLEM — H46.9 OPTIC NEURITIS: Status: ACTIVE | Noted: 2024-04-15

## 2024-04-15 PROBLEM — R76.8 POSITIVE ANA (ANTINUCLEAR ANTIBODY): Status: ACTIVE | Noted: 2024-04-15

## 2024-04-15 LAB
ALBUMIN SERPL-MCNC: 3.8 G/DL (ref 3.5–5)
ALBUMIN/GLOB SERPL: 0.9 RATIO (ref 1.1–2)
ALP SERPL-CCNC: 55 UNIT/L (ref 40–150)
ALT SERPL-CCNC: 25 UNIT/L (ref 0–55)
AST SERPL-CCNC: 14 UNIT/L (ref 5–34)
BASOPHILS # BLD AUTO: 0.02 X10(3)/MCL
BASOPHILS NFR BLD AUTO: 0.1 %
BILIRUB SERPL-MCNC: 0.5 MG/DL
BUN SERPL-MCNC: 14.5 MG/DL (ref 7–18.7)
CALCIUM SERPL-MCNC: 10.5 MG/DL (ref 8.4–10.2)
CHLORIDE SERPL-SCNC: 106 MMOL/L (ref 98–107)
CO2 SERPL-SCNC: 23 MMOL/L (ref 22–29)
CREAT SERPL-MCNC: 0.82 MG/DL (ref 0.55–1.02)
EOSINOPHIL # BLD AUTO: 0 X10(3)/MCL (ref 0–0.9)
EOSINOPHIL NFR BLD AUTO: 0 %
ERYTHROCYTE [DISTWIDTH] IN BLOOD BY AUTOMATED COUNT: 17.4 % (ref 11.5–17)
GFR SERPLBLD CREATININE-BSD FMLA CKD-EPI: >60 MLS/MIN/1.73/M2
GLOBULIN SER-MCNC: 4.2 GM/DL (ref 2.4–3.5)
GLUCOSE SERPL-MCNC: 132 MG/DL (ref 74–100)
HCT VFR BLD AUTO: 39.9 % (ref 37–47)
HGB BLD-MCNC: 12.8 G/DL (ref 12–16)
IMM GRANULOCYTES # BLD AUTO: 0.19 X10(3)/MCL (ref 0–0.04)
IMM GRANULOCYTES NFR BLD AUTO: 1 %
LYMPHOCYTES # BLD AUTO: 1.15 X10(3)/MCL (ref 0.6–4.6)
LYMPHOCYTES NFR BLD AUTO: 6.1 %
MCH RBC QN AUTO: 26.4 PG (ref 27–31)
MCHC RBC AUTO-ENTMCNC: 32.1 G/DL (ref 33–36)
MCV RBC AUTO: 82.3 FL (ref 80–94)
MONOCYTES # BLD AUTO: 0.27 X10(3)/MCL (ref 0.1–1.3)
MONOCYTES NFR BLD AUTO: 1.4 %
NEUTROPHILS # BLD AUTO: 17.21 X10(3)/MCL (ref 2.1–9.2)
NEUTROPHILS NFR BLD AUTO: 91.4 %
NRBC BLD AUTO-RTO: 0 %
PLATELET # BLD AUTO: 439 X10(3)/MCL (ref 130–400)
PMV BLD AUTO: 9.6 FL (ref 7.4–10.4)
POCT GLUCOSE: 116 MG/DL (ref 70–110)
POCT GLUCOSE: 119 MG/DL (ref 70–110)
POCT GLUCOSE: 126 MG/DL (ref 70–110)
POCT GLUCOSE: 129 MG/DL (ref 70–110)
POCT GLUCOSE: 135 MG/DL (ref 70–110)
POCT GLUCOSE: 166 MG/DL (ref 70–110)
POTASSIUM SERPL-SCNC: 4.4 MMOL/L (ref 3.5–5.1)
PROT SERPL-MCNC: 8 GM/DL (ref 6.4–8.3)
RBC # BLD AUTO: 4.85 X10(6)/MCL (ref 4.2–5.4)
SODIUM SERPL-SCNC: 139 MMOL/L (ref 136–145)
T4 FREE SERPL-MCNC: 0.81 NG/DL (ref 0.7–1.48)
TSH SERPL-ACNC: 0.18 UIU/ML (ref 0.35–4.94)
WBC # SPEC AUTO: 18.84 X10(3)/MCL (ref 4.5–11.5)

## 2024-04-15 PROCEDURE — 63600175 PHARM REV CODE 636 W HCPCS

## 2024-04-15 PROCEDURE — 25000003 PHARM REV CODE 250

## 2024-04-15 PROCEDURE — 63600175 PHARM REV CODE 636 W HCPCS: Performed by: STUDENT IN AN ORGANIZED HEALTH CARE EDUCATION/TRAINING PROGRAM

## 2024-04-15 PROCEDURE — 85025 COMPLETE CBC W/AUTO DIFF WBC: CPT

## 2024-04-15 PROCEDURE — 25000003 PHARM REV CODE 250: Performed by: INTERNAL MEDICINE

## 2024-04-15 PROCEDURE — 84443 ASSAY THYROID STIM HORMONE: CPT

## 2024-04-15 PROCEDURE — A9577 INJ MULTIHANCE: HCPCS

## 2024-04-15 PROCEDURE — 93005 ELECTROCARDIOGRAM TRACING: CPT

## 2024-04-15 PROCEDURE — 11000001 HC ACUTE MED/SURG PRIVATE ROOM

## 2024-04-15 PROCEDURE — 80053 COMPREHEN METABOLIC PANEL: CPT

## 2024-04-15 PROCEDURE — 25500020 PHARM REV CODE 255

## 2024-04-15 PROCEDURE — 99223 1ST HOSP IP/OBS HIGH 75: CPT | Mod: ,,, | Performed by: INTERNAL MEDICINE

## 2024-04-15 PROCEDURE — 21400001 HC TELEMETRY ROOM

## 2024-04-15 PROCEDURE — 84439 ASSAY OF FREE THYROXINE: CPT

## 2024-04-15 RX ORDER — MUPIROCIN 20 MG/G
OINTMENT TOPICAL DAILY
Status: DISCONTINUED | OUTPATIENT
Start: 2024-04-15 | End: 2024-04-16 | Stop reason: HOSPADM

## 2024-04-15 RX ADMIN — VALACYCLOVIR 1000 MG: 500 TABLET, FILM COATED ORAL at 09:04

## 2024-04-15 RX ADMIN — DIPHENHYDRAMINE HYDROCHLORIDE 25 MG: 25 CAPSULE ORAL at 09:04

## 2024-04-15 RX ADMIN — VALACYCLOVIR 1000 MG: 500 TABLET, FILM COATED ORAL at 08:04

## 2024-04-15 RX ADMIN — MUPIROCIN: 20 OINTMENT TOPICAL at 12:04

## 2024-04-15 RX ADMIN — METHYLPREDNISOLONE SODIUM SUCCINATE 1000 MG: 1 INJECTION, POWDER, LYOPHILIZED, FOR SOLUTION INTRAMUSCULAR; INTRAVENOUS at 10:04

## 2024-04-15 RX ADMIN — PANTOPRAZOLE SODIUM 40 MG: 40 TABLET, DELAYED RELEASE ORAL at 08:04

## 2024-04-15 RX ADMIN — OXYBUTYNIN CHLORIDE 15 MG: 5 TABLET, EXTENDED RELEASE ORAL at 08:04

## 2024-04-15 RX ADMIN — DOXYCYCLINE HYCLATE 100 MG: 100 TABLET, COATED ORAL at 09:04

## 2024-04-15 RX ADMIN — DOXYCYCLINE HYCLATE 100 MG: 100 TABLET, COATED ORAL at 08:04

## 2024-04-15 RX ADMIN — SODIUM CHLORIDE, POTASSIUM CHLORIDE, SODIUM LACTATE AND CALCIUM CHLORIDE 500 ML: 600; 310; 30; 20 INJECTION, SOLUTION INTRAVENOUS at 04:04

## 2024-04-15 RX ADMIN — FOLIC ACID 1 MG: 1 TABLET ORAL at 08:04

## 2024-04-15 RX ADMIN — GADOBENATE DIMEGLUMINE 20 ML: 529 INJECTION, SOLUTION INTRAVENOUS at 01:04

## 2024-04-15 RX ADMIN — HEPARIN SODIUM 5000 UNITS: 5000 INJECTION, SOLUTION INTRAVENOUS; SUBCUTANEOUS at 09:04

## 2024-04-15 RX ADMIN — LORAZEPAM 2 MG: 1 TABLET ORAL at 09:04

## 2024-04-15 RX ADMIN — INSULIN ASPART 2 UNITS: 100 INJECTION, SOLUTION INTRAVENOUS; SUBCUTANEOUS at 05:04

## 2024-04-15 RX ADMIN — LAMOTRIGINE 125 MG: 25 TABLET ORAL at 09:04

## 2024-04-15 RX ADMIN — SERTRALINE HYDROCHLORIDE 100 MG: 50 TABLET ORAL at 08:04

## 2024-04-15 RX ADMIN — HEPARIN SODIUM 5000 UNITS: 5000 INJECTION, SOLUTION INTRAVENOUS; SUBCUTANEOUS at 08:04

## 2024-04-15 NOTE — PLAN OF CARE
"Ophthalmology Plan of Care Note:     Interval Update:  Patient seen today. No changes in vision, no new or worsening symptoms.     Exam:   - VA stable, ONH appearance and posterior pole exam stable OU.     Interval Results:   - MRI Brain/Orbits wnl   - Bartonella [place] Ab panel pending     Recommendations:  - Continue PO Doxycycline 100mg BID to cover for Bartonella/Neuroretinitis   - Continue 1g IV Methylpred x3days (See previous notes for pre-treatment/reaction to IV steroids on previous admission)  - Continue GI ppx   - Of note, "+dsDNA with negative ROSITA panel, +RF" listed in previous ophthalmology consult/progress notes from different EMR, and was part of the workup obtained by Dr Mindi Odell (Ophthalmology - Uveitis specialist). Ophthalmology has also contacted Dr Eliud Lombardi (Neuro-Ophthalmologist) in [place] who agrees with current plan and feels that Dx is Posterior Scleritis and recommends Rheumatology Eval for starting Humira. Patient reports that Humira has already been ordered and she is in the process of starting.   - Patient to keep upcoming appointment with Dr Lombardi in 08/2024  - OK for discharge from ophthalmology standpoint once pt completes 3 days of IV steroids. Can discharge home on 60mg PO Prednisone daily.   - Ophthalmology will contact patient for follow up in outpatient clinic. Please contact on call resident for any questions or concerns.     Bry Hameed MD  [facility], PGY-3        "

## 2024-04-15 NOTE — PROGRESS NOTES
[facility] Medicine Wards   Progress Note     Resident Team: [facility] Medicine List 1  Attending Physician: Waldemar Henry MD  Resident: John  Intern: [facility] Length of Stay: 1 days    Subjective:      Brief HPI:  Alma Ca is a 19 y.o. female who  has a past medical history of ADHD (attention deficit hyperactivity disorder), Anxiety, Bipolar disorder, unspecified, Depression, and Hyperhidrosis.  She  is presenting to the emergency department with a 1 day history of decreased vision of her right eye.  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  There was suspicion that it may be related to a rheumatologic etiology and patient has been on prednisone 60 mg until 3 days ago the dose was reduced to 40 mg.  She has also been on methotrexate.  Describes her vision change as a a gray Haze.  She denies any pain with eye movement, flashes, floaters, curtain/fails,, fever, chills, N/V.        POcularHx:   August: crusting and redness of right eye, tx with abx drops, did not improve  Sept-Nov: treated by optom for redness, pain that woke patient up. Up to 100mg prednisone a day which helped but could not successfully taper down  Dec-Jan: seen by Brian, started on Brimonidine  Jan-Feb: seen by rheumatology, negative workup, started on MTX 6 pills per week  Feb-March: seen by Jose R, treated with Valtrex with some improvement, off steroids  March ~21st: 2 weeks of blurred vision with sudden onset noticed first thing in the morning also with blue photopsias. No change since. Otherwise symptoms stable.    Interval History: No acute events overnight, no changes in vision, will get third dose of solumedrol today, consulting rheumatology, MRI brain/orbits ordered.         Objective:     Vital Signs (Most Recent):  Temp: 97.6 °F (36.4 °C) (04/15/24 0745)  Pulse: (!) 128 (04/15/24 0745)  Resp: 18 (04/15/24 0745)  BP: 112/72 (04/15/24  0745)  SpO2: 96 % (04/15/24 0745) Vital Signs (24h Range):  Temp:  [97.4 °F (36.3 °C)-98.5 °F (36.9 °C)] 97.6 °F (36.4 °C)  Pulse:  [] 128  Resp:  [17-18] 18  SpO2:  [96 %-98 %] 96 %  BP: (111-127)/(71-79) 112/72       Physical Examination:  Physical Exam  Constitutional:       Appearance: She is obese.   Eyes:      Extraocular Movements: Extraocular movements intact.   Cardiovascular:      Rate and Rhythm: Normal rate and regular rhythm.      Pulses: Normal pulses.      Heart sounds: Normal heart sounds.   Pulmonary:      Effort: Pulmonary effort is normal.      Breath sounds: Normal breath sounds.   Abdominal:      General: Bowel sounds are normal.      Palpations: Abdomen is soft.   Musculoskeletal:      Cervical back: Normal range of motion and neck supple.   Skin:     Capillary Refill: Capillary refill takes less than 2 seconds.   Neurological:      Mental Status: She is alert and oriented to person, place, and time.      Comments: Visual field defect on the right side         Laboratory:  Most Recent Data:  Lab Results   Component Value Date     04/15/2024    K 4.4 04/15/2024    CO2 23 04/15/2024    BUN 14.5 04/15/2024    CREATININE 0.82 04/15/2024    CALCIUM 10.5 (H) 04/15/2024    ALKPHOS 55 04/15/2024    AST 14 04/15/2024    ALT 25 04/15/2024    MG 2.10 03/26/2024    PHOS 2.9 03/26/2024        Lab Results   Component Value Date    WBC 18.84 (H) 04/15/2024    RBC 4.85 04/15/2024    HGB 12.8 04/15/2024    HCT 39.9 04/15/2024    MCV 82.3 04/15/2024    MCH 26.4 (L) 04/15/2024    MCHC 32.1 (L) 04/15/2024    RDW 17.4 (H) 04/15/2024     (H) 04/15/2024    MPV 9.6 04/15/2024    EOS 0.0 10/03/2023    BASO 0.0 10/03/2023    EOSINOPHIL 0 10/03/2023           Other Results:  EKG (my interpretation):    Radiology:  Imaging Results    None         Current Medications:     Infusions:  Current Facility-Administered Medications   Medication Dose Route Frequency Provider Last Rate Last Admin    acetaminophen  tablet 650 mg  650 mg Oral Q8H PRN Stroda, Ajay, DO        dextrose 10% bolus 125 mL 125 mL  12.5 g Intravenous PRN Stroda, Ajay, DO        dextrose 10% bolus 250 mL 250 mL  25 g Intravenous PRN Stroda, Ajay, DO        diphenhydrAMINE capsule 25 mg  25 mg Oral Nightly PRN Stroda, Ajay, DO   25 mg at 04/14/24 2138    doxycycline tablet 100 mg  100 mg Oral Q12H Stroda, Ajay, DO   100 mg at 04/15/24 0802    folic acid tablet 1 mg  1 mg Oral Daily Waldemar Henry MD   1 mg at 04/15/24 0803    glucagon (human recombinant) injection 1 mg  1 mg Intramuscular PRN Stroda, Ajay, DO        glucose chewable tablet 16 g  16 g Oral PRN Stroda, Ajay, DO        glucose chewable tablet 24 g  24 g Oral PRN Stroda, Ajay, DO        heparin (porcine) injection 5,000 Units  5,000 Units Subcutaneous Q12H Stroda, Ajay, DO   5,000 Units at 04/15/24 0803    insulin aspart U-100 injection 0-10 Units  0-10 Units Subcutaneous QID (AC + HS) PRN Stroda, Ajay, DO        lamoTRIgine tablet 125 mg  125 mg Oral QHS Stroda, Ajay, DO   125 mg at 04/14/24 2138    LORazepam tablet 2 mg  2 mg Oral Q6H PRN Ryan Garcia MD   2 mg at 04/14/24 2217    melatonin tablet 6 mg  6 mg Oral Nightly PRN Stroda, Ajay, DO        methylPREDNISolone (SOLU-MEDROL) 1,000 mg in dextrose 5 % (D5W) 100 mL IVPB  1,000 mg Intravenous Q24H Ryan Garcia MD   Stopped at 04/14/24 2247    naloxone 0.4 mg/mL injection 0.02 mg  0.02 mg Intravenous PRN Stroda, Ajay, DO        oxybutynin 24 hr tablet 15 mg  15 mg Oral Daily Stroda, Ajay, DO   15 mg at 04/15/24 0803    pantoprazole EC tablet 40 mg  40 mg Oral Daily Cee Bailey MD   40 mg at 04/15/24 0803    sertraline tablet 100 mg  100 mg Oral Daily Stroda, Ajay, DO   100 mg at 04/15/24 0802    sodium chloride 0.9% flush 10 mL  10 mL Intravenous Q12H PRN Ajay Luong DO        valACYclovir tablet 1,000 mg  1,000 mg Oral BID Ajay Luong DO   1,000 mg at 04/15/24 0803        Scheduled:  Current Facility-Administered  Medications   Medication Dose Route Frequency Provider Last Rate Last Admin    acetaminophen tablet 650 mg  650 mg Oral Q8H PRN Stroda, Ajay, DO        dextrose 10% bolus 125 mL 125 mL  12.5 g Intravenous PRN Stroda, Ajay, DO        dextrose 10% bolus 250 mL 250 mL  25 g Intravenous PRN Stroda, Ajay, DO        diphenhydrAMINE capsule 25 mg  25 mg Oral Nightly PRN Stroda, Ajay, DO   25 mg at 04/14/24 2138    doxycycline tablet 100 mg  100 mg Oral Q12H Stroda, Ajay, DO   100 mg at 04/15/24 0802    folic acid tablet 1 mg  1 mg Oral Daily Waldemar Henry MD   1 mg at 04/15/24 0803    glucagon (human recombinant) injection 1 mg  1 mg Intramuscular PRN Stroda, Ajay, DO        glucose chewable tablet 16 g  16 g Oral PRN Stroda, Ajay, DO        glucose chewable tablet 24 g  24 g Oral PRN Stroda, Ajay, DO        heparin (porcine) injection 5,000 Units  5,000 Units Subcutaneous Q12H Stroda, Ajay, DO   5,000 Units at 04/15/24 0803    insulin aspart U-100 injection 0-10 Units  0-10 Units Subcutaneous QID (AC + HS) PRN Stroda, Ajay, DO        lamoTRIgine tablet 125 mg  125 mg Oral QHS Stroda, Ajay, DO   125 mg at 04/14/24 2138    LORazepam tablet 2 mg  2 mg Oral Q6H PRN Ryan Garcia MD   2 mg at 04/14/24 2217    melatonin tablet 6 mg  6 mg Oral Nightly PRN Stroda, Ajay, DO        methylPREDNISolone (SOLU-MEDROL) 1,000 mg in dextrose 5 % (D5W) 100 mL IVPB  1,000 mg Intravenous Q24H Ryan Garcia MD   Stopped at 04/14/24 2247    naloxone 0.4 mg/mL injection 0.02 mg  0.02 mg Intravenous PRN Stroda, Ajay, DO        oxybutynin 24 hr tablet 15 mg  15 mg Oral Daily Stroda, Ajay, DO   15 mg at 04/15/24 0803    pantoprazole EC tablet 40 mg  40 mg Oral Daily Cee Bailey MD   40 mg at 04/15/24 0803    sertraline tablet 100 mg  100 mg Oral Daily Stroda, Ajay, DO   100 mg at 04/15/24 0802    sodium chloride 0.9% flush 10 mL  10 mL Intravenous Q12H PRN Ajay Luong DO        valACYclovir tablet 1,000 mg  1,000 mg Oral BID Cherise  Ajay, DO   1,000 mg at 04/15/24 0803        PRN:  Current Facility-Administered Medications   Medication Dose Route Frequency Provider Last Rate Last Admin    acetaminophen tablet 650 mg  650 mg Oral Q8H PRN Stroda, Ajay, DO        dextrose 10% bolus 125 mL 125 mL  12.5 g Intravenous PRN Stroda, Ajay, DO        dextrose 10% bolus 250 mL 250 mL  25 g Intravenous PRN Stroda, Ajay, DO        diphenhydrAMINE capsule 25 mg  25 mg Oral Nightly PRN Stroda, Ajay, DO   25 mg at 04/14/24 2138    doxycycline tablet 100 mg  100 mg Oral Q12H Stroda, Ajay, DO   100 mg at 04/15/24 0802    folic acid tablet 1 mg  1 mg Oral Daily Waldemar Henry MD   1 mg at 04/15/24 0803    glucagon (human recombinant) injection 1 mg  1 mg Intramuscular PRN Stroda, Ajay, DO        glucose chewable tablet 16 g  16 g Oral PRN Stroda, Ajay, DO        glucose chewable tablet 24 g  24 g Oral PRN Stroda, Ajay, DO        heparin (porcine) injection 5,000 Units  5,000 Units Subcutaneous Q12H Stroda, Ajay, DO   5,000 Units at 04/15/24 0803    insulin aspart U-100 injection 0-10 Units  0-10 Units Subcutaneous QID (AC + HS) PRN Stroda, Ajay, DO        lamoTRIgine tablet 125 mg  125 mg Oral QHS Stroda, Ajay, DO   125 mg at 04/14/24 2138    LORazepam tablet 2 mg  2 mg Oral Q6H PRN Ryan Garcia MD   2 mg at 04/14/24 2217    melatonin tablet 6 mg  6 mg Oral Nightly PRN Stroda, Ajay, DO        methylPREDNISolone (SOLU-MEDROL) 1,000 mg in dextrose 5 % (D5W) 100 mL IVPB  1,000 mg Intravenous Q24H Ryan Garcia MD   Stopped at 04/14/24 2247    naloxone 0.4 mg/mL injection 0.02 mg  0.02 mg Intravenous PRN Stroda, Ajay, DO        oxybutynin 24 hr tablet 15 mg  15 mg Oral Daily Stroda, Ajay, DO   15 mg at 04/15/24 0803    pantoprazole EC tablet 40 mg  40 mg Oral Daily Cee Bailey MD   40 mg at 04/15/24 0803    sertraline tablet 100 mg  100 mg Oral Daily Ajay Luong DO   100 mg at 04/15/24 0802    sodium chloride 0.9% flush 10 mL  10 mL Intravenous Q12H PRN  Stroda, Ajay, DO        valACYclovir tablet 1,000 mg  1,000 mg Oral BID Stroda, Ajay, DO   1,000 mg at 04/15/24 0803       Antibiotics and Day Number of Therapy:  Doxycycline     No intake or output data in the 24 hours ending 04/15/24 1004    Lines/Drains/Airways       Peripheral Intravenous Line  Duration                  Peripheral IV - Single Lumen 04/14/24 0114 20 G Anterior;Right Forearm 1 day                      Assessment & Plan:     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  He was being treated for scleritis since August last year with varying amounts of oral prednisone and Valtrex.  Recommendations per ophthalmology team initiate high-dose methylprednisolone 1 g daily for 3 days, as well as doxycycline 100 mg b.i.d.  SSI and pantoprazole while on high dose steroids  Patient had activating reaction and development of rash with methylprednisolone that was given previously.  For this we will pre treat with 25 mg Benadryl.  will also place 2 mg p.r.n. Ativan for steroid induced insomnia.  Repeating MRI of brain/orbit with contrast, fat suppression, and thin slices.  Ophthalmology on board, appreciate assistance  Continue Valtrex for scleritis coverage  Will need taper steroid on discharge  Workup  Negative: ESR/CRP/HLAB27/ACE/lysozyme/CCP/ANCA/quant gold/hep panel, CSF studies, hypercoagulable labs except below, NMO/MOG serum abs, IGG4, soluble IL-2 receptor, IL6  Previous positive: +dsDNA with negative ROSITA panel, +RF  Negative: HIV, syphilis, homocysteine, fibrinogen, d-dimer, INR, APTT (slightly elevated)\  Positive: lipoprotein A (139, ULN 75)  Pending: further hypercoagulable workup, vitamin levels  Imaging:  CT brain/orbit w/wo 3/23/24 unremarkable   MRI/MRV brain/orbit w/wo 3/25/24 unremarkable    Tachycardia  Likely reactive  Starting tele monitor, ordering EKG  TSH 0.178, T4 normal, likely subacute hyperthyroidism  Low risk  for PE (Well's score 1.5)    CODE STATUS:  Full  Access: piv  Antibiotics:  Doxy  Diet:  Regular  DVT Prophylaxis:  Pantoprazole  GI Prophylaxis:  Famotidine  Fluids:  P.o.      Disposition:  Stable.  Admitted for IV steroids and Ophthalmology evaluations, on SSI and PPI, consulting rheumatology, getting MRI brain/orbits, consulting case management for records.    Cee Bailey MD  Internal Medicine - PGY-1

## 2024-04-15 NOTE — CONSULTS
[facility]  Consult Note    Patient Name: Alma Ca  : 2005  MRN: [id number]  Admission Date: 2024  Attending Provider: Waldemar Henry MD  Primary Care Physician: Jayda Drew DO    Date of Consultation: 04/15/2024    Consultation Requested By: Dr. Cee Bailey    Reason for Consultation: Optic neuritis, positive Anti-dsDNA    Subjective:     HPI: Ms. Alma Ca is a 19 y.o. 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  Rheumatology consulted.    Patient was followed by Dr. Katelyn Sanchez, further review of her note in the media tab shows that she has had a positive TATY however titers were not available, as well as a weakly positive rheumatoid factor, per patient at last visit patient was started on methotrexate and folic acid as recommendations by Ophthalmology.  Patient states that all her symptoms started approximately 9 months ago with eye redness where she was diagnosed with scleritis she has been followed by Ophthalmology as mentioned above and has seen by Neurology who has ruled out MS via MRI as well as lumbar puncture.  Please see plan below as well as labs below for further details.    Of note multiple notes stating that patient had positive anti-dsDNA however labs reviewed from prior workup shows this to be false.    Denies fevers, rashes, oral and nasal ulcers, history of DVT or PE, history of stroke or seizures, history of MI, history of malignancies, Raynaud's phenomenon.  Family history of autoimmune disease:  None  Smokin pack per month  Pregnancies:  0 Miscarriages 0    Patient has  No Known Allergies.     Past Medical History:  has a past medical history of ADHD (attention deficit hyperactivity disorder), Anxiety, Bipolar disorder, unspecified, Depression, and Hyperhidrosis.    Procedure History:  has a past surgical history that includes Tonsillectomy and Adenoidectomy.    Family History: family history includes Arthritis in her maternal grandfather, maternal grandmother, and mother; COPD in her paternal grandfather; Depression in her mother; Drug abuse in her maternal grandfather; Lupus in her maternal cousin; Mental illness in her mother; Miscarriages / Stillbirths in her mother; No Known Problems in her brother, father, and sister; Rheum arthritis in her maternal grandfather.    Social History:  reports that she has been smoking cigarettes. She has never used smokeless tobacco. She reports current alcohol use of about 1.0 standard drink of alcohol per week. She reports that she does not use drugs.    Review of systems:   CONSTITUTIONAL: negative with no fatigue or fevers  SKIN: negative with no recent rashes  EYES:  Positive for blurry vision in right eye  ENT: negative with no mouth dryness or sores  ENDO: negative with no hypothyroid symptoms  HEME: negative, with no history of anemia or DVT  CV: negative without exertional chest pain, palpitations, or edema  RESP: negative without cough, dyspnea, or pleuritic pain  GI: negative without nausea, vomiting, heartburn, or bleeding  NEURO: negative, with no imbalance and no numbness or tingling of the hands or feet  MUSCULOSKELETAL: as per HPI    Inpatient Medications:     Current Facility-Administered Medications:     acetaminophen tablet 650 mg, 650 mg, Oral, Q8H PRN, Stroda, Ajay, DO    dextrose 10% bolus 125 mL 125 mL, 12.5 g, Intravenous, PRN, Stroda, Ajay, DO    dextrose 10% bolus 250 mL 250 mL, 25 g, Intravenous, PRN, Stroda, Ajay, DO    diphenhydrAMINE capsule 25 mg, 25 mg, Oral, Nightly PRN, Stroda, Ajay, DO, 25 mg at 04/14/24 5377     doxycycline tablet 100 mg, 100 mg, Oral, Q12H, Stroda, Ajay, DO, 100 mg at 04/15/24 0802    folic acid tablet 1 mg, 1 mg, Oral, Daily, Waldemar Henry MD, 1 mg at 04/15/24 0803    glucagon (human recombinant) injection 1 mg, 1 mg, Intramuscular, PRN, Stroda, Ajay, DO    glucose chewable tablet 16 g, 16 g, Oral, PRN, Stroda, Ajay, DO    glucose chewable tablet 24 g, 24 g, Oral, PRN, Stroda, Ajay, DO    heparin (porcine) injection 5,000 Units, 5,000 Units, Subcutaneous, Q12H, Stroda, Ajay, DO, 5,000 Units at 04/15/24 0803    insulin aspart U-100 injection 0-10 Units, 0-10 Units, Subcutaneous, QID (AC + HS) PRN, Stroda, Ajay, DO    lamoTRIgine tablet 125 mg, 125 mg, Oral, QHS, Stroda, Ajay, DO, 125 mg at 04/14/24 2138    LORazepam tablet 2 mg, 2 mg, Oral, Q6H PRN, Ryan Garcia MD, 2 mg at 04/14/24 2217    melatonin tablet 6 mg, 6 mg, Oral, Nightly PRN, Stroda, Ajay, DO    methylPREDNISolone (SOLU-MEDROL) 1,000 mg in dextrose 5 % (D5W) 100 mL IVPB, 1,000 mg, Intravenous, Q24H, Ryan Garcia MD, Stopped at 04/14/24 2247    naloxone 0.4 mg/mL injection 0.02 mg, 0.02 mg, Intravenous, PRN, Stroda, Ajay, DO    oxybutynin 24 hr tablet 15 mg, 15 mg, Oral, Daily, Stroda, Ajay, DO, 15 mg at 04/15/24 0803    pantoprazole EC tablet 40 mg, 40 mg, Oral, Daily, Cee Bailey MD, 40 mg at 04/15/24 0803    sertraline tablet 100 mg, 100 mg, Oral, Daily, Stroda, Ajay, DO, 100 mg at 04/15/24 0802    sodium chloride 0.9% flush 10 mL, 10 mL, Intravenous, Q12H PRN, Stroda, Ajay, DO    valACYclovir tablet 1,000 mg, 1,000 mg, Oral, BID, Stroda, Ajay, DO, 1,000 mg at 04/15/24 0803     Objective:     Vital Signs (Most Recent):  Temp: 97.6 °F (36.4 °C) (04/15/24 0745)  Pulse: (!) 128 (04/15/24 0745)  Resp: 18 (04/15/24 0745)  BP: 112/72 (04/15/24 0745)  SpO2: 96 % (04/15/24 0745)   Vital Signs (24h Range):  Temp:  [97.4 °F (36.3 °C)-98.5 °F (36.9 °C)] 97.6 °F (36.4 °C)  Pulse:  [] 128  Resp:  [17-18] 18  SpO2:  [96 %-98 %] 96 %  BP:  (111-127)/(71-79) 112/72     Weight: 122.4 kg (269 lb 12.8 oz) (04/14/24 0345)  Body mass index is 41.02 kg/m².  Body surface area is 2.42 meters squared.    No intake or output data in the 24 hours ending 04/15/24 1111    Physical Exam:   General Appearance: no acute distress and cooperative  Skin: Skin color, texture, turgor normal.  Flushing is present likely 2/2 steroid use  Eyes: conjunctivae/corneas clear.  Dilated pupils 2/2 prior dilation by Ophthalmology, EOM's intact.   ENT: No oral or nasal ulcers.  Neck:  Neck supple. No adenopathy.   Lungs: CTA throughout without crackles, rhonchi, or wheezes.   Heart:  Tachycardic with regular rhythm w/o murmurs.  Abdomen: Soft, non-tender, no masses, organomegaly, rebound or guarding.  Neuro: CN III-XII GI, sensory and motor innervation intact, patient endorses decreased vision in right eye, unable to get appropriate pupillary reflex due to patients eyes with dilated by Ophthalmology  Musculoskeletal:  Unremarkable without signs of joint tenderness    Significant Labs:  3/26/24:   ESR 22.  CRP normal.  PTH normal. No protein and blood in urine.  CSF WBC is 2, protein normal, glucose slightly elevated 96.  No growth in CSF.  Factor VIII   Level normal. factor 5 Leiden mutation negative.  Antithrombin 3  activity normal.  IgG4  subclass normal.  Serum IL 6 normal.  Lipoprotein a elevated 139.  NMO/ Aquaphor in for antibody negative.  Lupus anticoagulant not detected.  Phospholipid IgA negative.  Protein C activity normal.  4/8/24:   Cardiolipin  IgG, IgA, IgM negative.  SPEP and JAG normal.  No M spike.  QuantiFERON TB negative.  HIV   and syphilis antibody negative.  ACE level normal.  CSF oligoclonal bands negative.  IgG index in  CSF normal.    04/15/2024; WBC count elevated 18.84 secondary to steroids.  Platelet count elevated 439. Calcium 10.5, elevated in the past intermittently,  highest calcium 11.1 on 03/23/2024.    1/2024:   HLA B27 negative.  ACE level  "normal.  Anti CCP negative.  Positive TATY.  Negative centromere, dsDNA, histone, Leanne 1, Scl 70 , RNP, SSA, SSB, Smith, TPO.  C3, C4 okay.  Rheumatoid factor  IgG 9, IgM 15, IgA negative, normal less than 7.  MPO, PR3 and ANCA negative.  Lysozyme normal.  ESR, CRP negative.  HIV negative.  Hepatitis-B, hepatitis-C negative.  QuantiFERON TB negative.  Hepatitis-B core negative.  Chest x-ray showed lungs are clear.    Blood Culture: No results for input(s): "LABBLOO" in the last 24 hours.  CBC:   Recent Labs   Lab 04/15/24  0353   WBC 18.84*   HGB 12.8   HCT 39.9   *       CMP:   Recent Labs   Lab 04/15/24  0353   CALCIUM 10.5*   ALBUMIN 3.8      K 4.4   CO2 23   BUN 14.5   CREATININE 0.82   ALKPHOS 55   ALT 25   AST 14   BILITOT 0.5       CRP:   No results for input(s): "CRP" in the last 24 hours.    ESR:   No results for input(s): "SEDRATE" in the last 24 hours.      All pertinent lab results from the last 24 hours have been reviewed.    Significant Imaging:  3/23/24:   CT head was normal.  CT orbits was normal.    3/25/24:   MRI of brain without contrast was unremarkable, no suggestion of sinus thrombosis.  Nonspecific bilateral periventricular white matter signal abnormalities in occipital lobes, no abnormal parenchymal enhancement.    03/25/2024; MRI of orbits was normal. Optic nerves are symmetric and normal in size without abnormal enhancement.    4/15/24:  MRI pending      Assessment/Plan:     History of positive TATY  -further lab reviews week sleep positive rheumatoid factor as well as on quantified TATY  -Repeat TATY and Anti-dsDNA pending  -continue methotrexate and folic acid this time  -continue steroids    Right optic nerve edema  -Patient currently being followed by Ophthalmology  -patient has had extensive workup, has seen Neurology which has ruled out MS and is recommending patient be followed by Neuro-Ophthalmology    Hypoglycemia  -Likely 2/2 steroid  -Continue sliding scale insulin while " on steroids    Plan discussed with primary team.     Thank you for your consult. I will follow-up with patient. Please contact us if you have any additional questions.    Javed Byrd MD  Internal Medicine - PGY-3

## 2024-04-15 NOTE — NURSING
Left Foot: Patient stated that it was originally a bug bite. She had scratched it while in the shower and did not realize how hard she scratched. Patient stated that she she did some wound care at home involving creams but also stated that she put hydrogen peroxide on it. Patient educated about the effects of hydrogen peroxide on wounds. Patient states that she understands. Wound bed covered in dry slough with pink wound edges. Cleansed with NS, pat dry, xeroform and pad dressing applied.

## 2024-04-15 NOTE — PLAN OF CARE
Spoke to Devika with Dr. Katelyn Sanchez' office, P: [phone], Ext. [phone], who stated that she will fax patient's records. Will scan into chart once received.

## 2024-04-15 NOTE — PLAN OF CARE
Problem: Bariatric Environmental Safety  Goal: Safety Maintained with Care  Outcome: Ongoing, Progressing     Problem: Fall Injury Risk  Goal: Absence of Fall and Fall-Related Injury  Outcome: Ongoing, Progressing

## 2024-04-15 NOTE — CONSULTS
Wound care was consulted for a wound present on admission to left lateral ankle. Photo was reviewed and pt is noted with a simple abrasion. Pt has no DM history and therefore no need for wound care to follow. Will place orders for nursing to dress. Will include topical mupirocin due to mild erythema. NO need for follow. Wound care remains available should things change.

## 2024-04-15 NOTE — PLAN OF CARE
Problem: Fall Injury Risk  Goal: Absence of Fall and Fall-Related Injury  Outcome: Ongoing, Progressing  Intervention: Identify and Manage Contributors  Flowsheets (Taken 4/15/2024 0408)  Medication Review/Management: medications reviewed  Intervention: Promote Injury-Free Environment  Flowsheets (Taken 4/15/2024 0408)  Safety Promotion/Fall Prevention: assistive device/personal item within reach     Problem: Fall Injury Risk  Goal: Absence of Fall and Fall-Related Injury  Intervention: Identify and Manage Contributors  Flowsheets (Taken 4/15/2024 0408)  Medication Review/Management: medications reviewed     Problem: Fall Injury Risk  Goal: Absence of Fall and Fall-Related Injury  Intervention: Promote Injury-Free Environment  Flowsheets (Taken 4/15/2024 0408)  Safety Promotion/Fall Prevention: assistive device/personal item within reach

## 2024-04-15 NOTE — CONSULTS
Consult to obtain medical records noted. Call placed to the office of patient's rheumatologist, Dr. Katelyn Sanchez, P: [phone], Ext. [phone]. Left voice message requesting return call and medical records be faxed to  department, F: [phone]. Will follow.

## 2024-04-16 VITALS
SYSTOLIC BLOOD PRESSURE: 143 MMHG | BODY MASS INDEX: 40.89 KG/M2 | WEIGHT: 269.81 LBS | TEMPERATURE: 98 F | DIASTOLIC BLOOD PRESSURE: 85 MMHG | OXYGEN SATURATION: 97 % | HEART RATE: 112 BPM | RESPIRATION RATE: 18 BRPM | HEIGHT: 68 IN

## 2024-04-16 LAB
ALBUMIN SERPL-MCNC: 3.4 G/DL (ref 3.5–5)
ALBUMIN/GLOB SERPL: 1 RATIO (ref 1.1–2)
ALP SERPL-CCNC: 50 UNIT/L (ref 40–150)
ALT SERPL-CCNC: 20 UNIT/L (ref 0–55)
AST SERPL-CCNC: 9 UNIT/L (ref 5–34)
B HENSELAE IGG TITR SER IF: NORMAL TITER
B HENSELAE IGM TITR SER IF: NORMAL TITER
B QUINTANA IGG TITR SER IF: NORMAL TITER
B QUINTANA IGM TITR SER IF: NORMAL TITER
BASOPHILS # BLD AUTO: 0.01 X10(3)/MCL
BASOPHILS NFR BLD AUTO: 0.1 %
BILIRUB SERPL-MCNC: 0.4 MG/DL
BUN SERPL-MCNC: 17.5 MG/DL (ref 7–18.7)
CALCIUM SERPL-MCNC: 9.8 MG/DL (ref 8.4–10.2)
CHLORIDE SERPL-SCNC: 107 MMOL/L (ref 98–107)
CO2 SERPL-SCNC: 21 MMOL/L (ref 22–29)
CREAT SERPL-MCNC: 0.79 MG/DL (ref 0.55–1.02)
EOSINOPHIL # BLD AUTO: 0 X10(3)/MCL (ref 0–0.9)
EOSINOPHIL NFR BLD AUTO: 0 %
ERYTHROCYTE [DISTWIDTH] IN BLOOD BY AUTOMATED COUNT: 17.5 % (ref 11.5–17)
GFR SERPLBLD CREATININE-BSD FMLA CKD-EPI: >60 MLS/MIN/1.73/M2
GLOBULIN SER-MCNC: 3.5 GM/DL (ref 2.4–3.5)
GLUCOSE SERPL-MCNC: 141 MG/DL (ref 74–100)
HCT VFR BLD AUTO: 37.7 % (ref 37–47)
HGB BLD-MCNC: 12.1 G/DL (ref 12–16)
IMM GRANULOCYTES # BLD AUTO: 0.16 X10(3)/MCL (ref 0–0.04)
IMM GRANULOCYTES NFR BLD AUTO: 1 %
LYMPHOCYTES # BLD AUTO: 1.02 X10(3)/MCL (ref 0.6–4.6)
LYMPHOCYTES NFR BLD AUTO: 6.4 %
MCH RBC QN AUTO: 26.7 PG (ref 27–31)
MCHC RBC AUTO-ENTMCNC: 32.1 G/DL (ref 33–36)
MCV RBC AUTO: 83.2 FL (ref 80–94)
MONOCYTES # BLD AUTO: 0.24 X10(3)/MCL (ref 0.1–1.3)
MONOCYTES NFR BLD AUTO: 1.5 %
NEUTROPHILS # BLD AUTO: 14.45 X10(3)/MCL (ref 2.1–9.2)
NEUTROPHILS NFR BLD AUTO: 91 %
NRBC BLD AUTO-RTO: 0 %
OHS QRS DURATION: 78 MS
OHS QTC CALCULATION: 443 MS
PLATELET # BLD AUTO: 406 X10(3)/MCL (ref 130–400)
PMV BLD AUTO: 9.9 FL (ref 7.4–10.4)
POCT GLUCOSE: 128 MG/DL (ref 70–110)
POCT GLUCOSE: 130 MG/DL (ref 70–110)
POCT GLUCOSE: 136 MG/DL (ref 70–110)
POCT GLUCOSE: 165 MG/DL (ref 70–110)
POTASSIUM SERPL-SCNC: 4 MMOL/L (ref 3.5–5.1)
PROT SERPL-MCNC: 6.9 GM/DL (ref 6.4–8.3)
RBC # BLD AUTO: 4.53 X10(6)/MCL (ref 4.2–5.4)
SODIUM SERPL-SCNC: 138 MMOL/L (ref 136–145)
WBC # SPEC AUTO: 15.88 X10(3)/MCL (ref 4.5–11.5)

## 2024-04-16 PROCEDURE — 63600175 PHARM REV CODE 636 W HCPCS

## 2024-04-16 PROCEDURE — 99233 SBSQ HOSP IP/OBS HIGH 50: CPT | Mod: ,,, | Performed by: INTERNAL MEDICINE

## 2024-04-16 PROCEDURE — 25000003 PHARM REV CODE 250

## 2024-04-16 PROCEDURE — 85025 COMPLETE CBC W/AUTO DIFF WBC: CPT

## 2024-04-16 PROCEDURE — 80053 COMPREHEN METABOLIC PANEL: CPT

## 2024-04-16 PROCEDURE — 25000003 PHARM REV CODE 250: Performed by: INTERNAL MEDICINE

## 2024-04-16 PROCEDURE — 86039 ANTINUCLEAR ANTIBODIES (ANA): CPT | Performed by: INTERNAL MEDICINE

## 2024-04-16 PROCEDURE — 86225 DNA ANTIBODY NATIVE: CPT | Performed by: INTERNAL MEDICINE

## 2024-04-16 RX ORDER — ADALIMUMAB 40MG/0.4ML
40 KIT SUBCUTANEOUS
Qty: 4 PEN | Refills: 11 | Status: SHIPPED | OUTPATIENT
Start: 2024-04-16 | End: 2025-04-16

## 2024-04-16 RX ORDER — SULFAMETHOXAZOLE AND TRIMETHOPRIM 800; 160 MG/1; MG/1
1 TABLET ORAL EVERY OTHER DAY
Status: DISCONTINUED | OUTPATIENT
Start: 2024-04-17 | End: 2024-04-16 | Stop reason: HOSPADM

## 2024-04-16 RX ORDER — SULFAMETHOXAZOLE AND TRIMETHOPRIM 800; 160 MG/1; MG/1
1 TABLET ORAL EVERY OTHER DAY
Qty: 15 TABLET | Refills: 11 | Status: SHIPPED | OUTPATIENT
Start: 2024-04-17 | End: 2025-04-17

## 2024-04-16 RX ORDER — PANTOPRAZOLE SODIUM 40 MG/1
40 TABLET, DELAYED RELEASE ORAL DAILY
Qty: 90 TABLET | Refills: 3 | Status: SHIPPED | OUTPATIENT
Start: 2024-04-17 | End: 2025-04-17

## 2024-04-16 RX ORDER — DOXYCYCLINE HYCLATE 100 MG
100 TABLET ORAL EVERY 12 HOURS
Qty: 30 TABLET | Refills: 1 | Status: SHIPPED | OUTPATIENT
Start: 2024-04-16 | End: 2024-05-16

## 2024-04-16 RX ORDER — PREDNISONE 20 MG/1
60 TABLET ORAL DAILY
Qty: 90 TABLET | Refills: 0 | Status: SHIPPED | OUTPATIENT
Start: 2024-04-16 | End: 2024-05-16

## 2024-04-16 RX ADMIN — PANTOPRAZOLE SODIUM 40 MG: 40 TABLET, DELAYED RELEASE ORAL at 08:04

## 2024-04-16 RX ADMIN — FOLIC ACID 1 MG: 1 TABLET ORAL at 08:04

## 2024-04-16 RX ADMIN — OXYBUTYNIN CHLORIDE 15 MG: 5 TABLET, EXTENDED RELEASE ORAL at 08:04

## 2024-04-16 RX ADMIN — VALACYCLOVIR 1000 MG: 500 TABLET, FILM COATED ORAL at 08:04

## 2024-04-16 RX ADMIN — SERTRALINE HYDROCHLORIDE 100 MG: 50 TABLET ORAL at 08:04

## 2024-04-16 RX ADMIN — HEPARIN SODIUM 5000 UNITS: 5000 INJECTION, SOLUTION INTRAVENOUS; SUBCUTANEOUS at 08:04

## 2024-04-16 RX ADMIN — DOXYCYCLINE HYCLATE 100 MG: 100 TABLET, COATED ORAL at 08:04

## 2024-04-16 RX ADMIN — MUPIROCIN: 20 OINTMENT TOPICAL at 08:04

## 2024-04-16 NOTE — DISCHARGE SUMMARY
[facility] Discharge Summary    Admitting Physician: Waldemar Henry MD  Attending Physician: Waldemar Henry MD  Date of Admit: 4/13/2024  Date of Discharge: 4/16/2024    Condition: Stable  Outcome: Condition has improved and patient is now ready for discharge.  DISPOSITION: Home or Self Care          Discharge Diagnoses     Patient Active Problem List   Diagnosis    Optic disc edema    Vision loss of right eye    Optic neuritis    Positive TATY (antinuclear antibody)    Scleritis of right eye    Long term (current) use of systemic steroids       Principal Problem:  Vision loss of right eye    Consultants and Procedures     Consultants:  IP CONSULT TO OPHTHALMOLOGY  IP CONSULT TO HOSPITAL MEDICINE  WOUND CARE CONSULT  IP CONSULT TO RHEUMATOLOGY  IP CONSULT TO SOCIAL WORK/CASE MANAGEMENT    Procedures:   * No surgery found *     Brief Admission History      Alma Ca is a 19 y.o. female who  has a past medical history of ADHD (attention deficit hyperactivity disorder), Anxiety, Bipolar disorder, unspecified, Depression, and Hyperhidrosis.  She  is presenting to the emergency department with a 1 day history of decreased vision of her right eye.  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  There was suspicion that it may be related to a rheumatologic etiology and patient has been on prednisone 60 mg until 3 days ago the dose was reduced to 40 mg.  She has also been on methotrexate.  Describes her vision change as a a gray Haze.  She denies any pain with eye movement, flashes, floaters, curtain/fails,, fever, chills, N/V.        POcularHx:   August: crusting and redness of right eye, tx with abx drops, did not improve  Sept-Nov: treated by optom for redness, pain that woke patient up. Up to 100mg prednisone a day which helped but could not successfully taper down  Dec-Jan: seen by Snehal, started on  "Brimonidine  Jan-Feb: seen by rheumatology, negative workup, started on MTX 6 pills per week  Feb-March: seen by Jose R, treated with Valtrex with some improvement, off steroids  March ~21st: 2 weeks of blurred vision with sudden onset noticed first thing in the morning also with blue photopsias. No change since. Otherwise symptoms stable.    Hospital Course with Pertinent Findings     Patient was admitted for unilateral vision loss concerning for optic neuritis versus urine peritonitis versus posterior scleritis, patient was started on pulse dose Solu-Medrol for 3 days, no change in vision was reported by patient during admission, MRI was done and was negative, low suspicion for MS and optic neuritis, fundus exam shows optic nerve edema. 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  Patient to follow up with Ophthalmology and Rheumatology outpatient.    Discharge physical exam:  Vitals  BP: (!) 143/85  Temp: 98 °F (36.7 °C)  Temp Source: Oral  Pulse: (!) 112  Resp: 18  SpO2: 97 %  Height: 5' 8" (172.7 cm)  Weight: 122.4 kg (269 lb 12.8 oz)    Physical Exam  Constitutional:       Appearance: Normal appearance.   Eyes:      Extraocular Movements: Extraocular movements intact.      Pupils: Pupils are equal, round, and reactive to light.   Cardiovascular:      Rate and Rhythm: Normal rate and regular rhythm.      Pulses: Normal pulses.      Heart sounds: Normal heart sounds.   Pulmonary:      Effort: Pulmonary effort is normal.      Breath sounds: Normal breath sounds.   Abdominal:      General: Abdomen is flat.      Palpations: Abdomen is soft.   Musculoskeletal:         " General: Normal range of motion.      Cervical back: Normal range of motion and neck supple.   Skin:     General: Skin is warm and dry.      Capillary Refill: Capillary refill takes less than 2 seconds.   Neurological:      Mental Status: She is alert and oriented to person, place, and time.         TIME SPENT ON DISCHARGE: 60 minutes    Discharge Medications        Medication List        START taking these medications      doxycycline 100 MG tablet  Commonly known as: VIBRA-TABS  Take 1 tablet (100 mg total) by mouth every 12 (twelve) hours.     HUMIRA(CF) PEN 40 mg/0.4 mL Pnkt  Generic drug: adalimumab  Inject 0.4 mLs (40 mg total) into the skin every 7 days.     pantoprazole 40 MG tablet  Commonly known as: PROTONIX  Take 1 tablet (40 mg total) by mouth once daily.  Start taking on: April 17, 2024            CHANGE how you take these medications      predniSONE 20 MG tablet  Commonly known as: DELTASONE  Take 3 tablets (60 mg total) by mouth once daily.  What changed: how much to take            CONTINUE taking these medications      BIOTIN ORAL     folic acid 1 MG tablet  Commonly known as: FOLVITE     ibuprofen 200 MG tablet  Commonly known as: ADVIL,MOTRIN     INTUNIV ER ORAL     lamoTRIgine 25 MG tablet  Commonly known as: LAMICTAL     MELATIN 3 mg tablet  Generic drug: melatonin     methotrexate 2.5 MG Tab     oxybutynin 15 MG Tr24  Commonly known as: DITROPAN XL  Take 1 tablet (15 mg total) by mouth once daily.     sertraline 50 MG tablet  Commonly known as: ZOLOFT     valACYclovir 1000 MG tablet  Commonly known as: VALTREX               Where to Get Your Medications        These medications were sent to [facility] - [address] [address]      Phone: [phone]   doxycycline 100 MG tablet  HUMIRA(CF) PEN 40 mg/0.4 mL Pnkt  pantoprazole 40 MG tablet  predniSONE 20 MG tablet         Discharge Information:     Start Humira  weekly, continue MTX, will need Batcrim for PJP PPx  Prednisone 60 mg daily, to be tapered by ophthalmology  Continue doxicycline, will need to stop if  bartonella serology negative  Follow up with ophthalmology  Follow up with rheumatology  Follow up with PCP    Cee Bailey MD  Internal Medicine - PGY-1

## 2024-04-16 NOTE — PLAN OF CARE
Records from Dr. Katelyn Sanchez' office have been received and scanned into patient's chart under .

## 2024-04-17 LAB
ANA SER QL HEP2 SUBST: NORMAL
BACTERIA WND CULT: NORMAL
DSDNA AB QUANT (OHS): <0.6 IU/ML

## 2024-04-17 NOTE — PROGRESS NOTES
I have seen her in the hospital while she was inpatient, please let the patient know that repeat TATY and dsDNA came back negative.

## 2024-04-22 ENCOUNTER — PATIENT MESSAGE (OUTPATIENT)
Dept: RHEUMATOLOGY | Facility: CLINIC | Age: 19
End: 2024-04-22
Payer: COMMERCIAL

## 2024-05-03 ENCOUNTER — OFFICE VISIT (OUTPATIENT)
Dept: OPHTHALMOLOGY | Facility: CLINIC | Age: 19
End: 2024-05-03
Payer: COMMERCIAL

## 2024-05-03 VITALS — BODY MASS INDEX: 40.89 KG/M2 | WEIGHT: 269.81 LBS | HEIGHT: 68 IN

## 2024-05-03 DIAGNOSIS — H15.031 POSTERIOR SCLERITIS OF RIGHT EYE: Primary | ICD-10-CM

## 2024-05-03 DIAGNOSIS — H46.9 OPTIC NEURITIS, RIGHT: ICD-10-CM

## 2024-05-03 PROCEDURE — 92133 CPTRZD OPH DX IMG PST SGM ON: CPT | Mod: PBBFAC,PN | Performed by: OPHTHALMOLOGY

## 2024-05-03 PROCEDURE — 76512 OPH US DX B-SCAN: CPT | Mod: PBBFAC,PN,GC

## 2024-05-03 PROCEDURE — 99213 OFFICE O/P EST LOW 20 MIN: CPT | Mod: PBBFAC,PN,25

## 2024-05-03 RX ORDER — SERTRALINE HYDROCHLORIDE 100 MG/1
100 TABLET, FILM COATED ORAL
COMMUNITY
Start: 2024-04-24

## 2024-05-03 RX ORDER — LAMOTRIGINE 100 MG/1
100 TABLET ORAL
COMMUNITY
Start: 2024-04-24

## 2024-05-03 RX ORDER — GUANFACINE 3 MG/1
1 TABLET, EXTENDED RELEASE ORAL
COMMUNITY

## 2024-05-03 RX ORDER — ADALIMUMAB 40MG/0.4ML
KIT SUBCUTANEOUS
COMMUNITY
Start: 2024-05-01

## 2024-05-03 NOTE — PROGRESS NOTES
HPI     Optic neuritis, right     Additional comments: Return 3 weeks          Last edited by Errol Valdovinos LPN on 5/3/2024  8:30 AM.            Assessment /Plan     For exam results, see Encounter Report.    Posterior scleritis of right eye    Optic neuritis, right      HVF 24-2              4/5/24: OD reliable, dense inf arcuate, SNS // OS unreliable, full     OCT RNFL              4/5/24: 179 white T/N/I, green S // 92, All Green   5/3/24: 90, Yellow S, Green T/N/I // 99, All Green      Optos 4/5/24: 2+ ONH edema with mild pallor OD // p/s, small cup left eye    IVFA 4/10/24:   OD: media clear, window defect circumferentially around nerve 2-3DD. Leakage right nerve, periphery wnl  OS: media clear, staining (likely) nasal nerve, macula and periphery wnl     1. 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 Had some improvement of symptoms.  - Initial examination 3/23/24: 2 weeks of blurred vision right eye, sudden onset first noticed in the morning without worse pain than usual. No improvement. Started on 60mg PO pred 3/22/24, sent to ED for evaluation.  -Right eye: 20/800, noel APD. Spared SN visual field, complete loss IN, relative loss ST/IT. 3+ ONH edema with tracking fluid and mild heme. Left eye: unremarkable except <0.1 cup/disc  3/24/24: 20/100 near with eccentric viewing (with great difficulty). Still inf > sup visual field depression, relative ST > SN sparing.   3/25/24: VA much improved, 20/25 near. Improving visual field especially superiorly. MRI negative and good response to steroids. 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 Plan to do 500mg solu medrol today and tomorrow. LP tomorrow then likely discharge pending attending evaluation.  - Labwork: has had extensive infectious and inflammatory workup for scleritis performed previously              Negative:  ESR/CRP/HLAB27/ACE/lysozyme/CCP/ANCA/quant gold/hep panel, CSF studies, hypercoagulable labs except below, NMO/MOG serum abs, IGG4, soluble IL-2 receptor, IL6              Previous+ (Jose R): +dsDNA w/ neg ROSITA panel, +RF    - Repeat dsDNA and TATY negative on 4/16/24  Negative: HIV, syphilis, homocysteine, fibrinogen, d-dimer, INR, APTT (slightly elevated), B1/B12 wnl, DRVVT wnl   Positive: lipoprotein A (139, ULN 75)  - Imaging:  CT brain/orbit w/wo 3/23/24 unremarkable   MRI/MRV brain/orbit w/wo 3/25/24 unremarkable  MRI Brain/Orbit w/wo 4/15/24 unremarkable   - 4/5/24: still with poor eccentric vision of 20/70 near. VF with strong inferior arcuate defect and superior nasal defect. Left eye unreliable but without gross defect. Only positive on workup has been lipoprotein A, which can be associated with NAION. VF could be consistent with it, but still quite unusual for this age group. She also has had slightly elevated LDL in the past. Unclear what the etiology is at this time. Will order further hypercoagulable workup, along with vitamin and a few others. We will place referral for second opinion with Dr. Eliud Lombardi in [place] and appreciate his assistance.  4/13/24: VA stable, exam with possible early macular star with faint linear hyperreflective exudates nasal to fovea, ONH edema stable/improved   - Completed 2nd round of 1g IV Methylpred x3days and Doxycyline 100mg PO BID x 10 days without improvement   - Dr Eliud Lombardi ([facility] in [place]) who reviewed case, likely dx is Posterior Scleritis and recommends Rheumatology Eval for starting Humira.  - 5/3/24: Repeat workup negative, VA unchanged. No new/worsening sx. Currently on PO Pred 40mg, tapering per Dr Odell   - Slight improvement in ONH edema on exam/OCT, optos taken, Bscan uploaded to media   - Started Humira ~4/16/24  - Monocular precautions discussed, polycarb MRx provided     RTC 4mo following visit with Dr Lombardi, sooner PRN for return of sx

## 2024-08-09 ENCOUNTER — HOSPITAL ENCOUNTER (EMERGENCY)
Facility: HOSPITAL | Age: 19
Discharge: LEFT BEFORE DISCHARGE | End: 2024-08-09
Attending: INTERNAL MEDICINE
Payer: COMMERCIAL

## 2024-08-09 VITALS
DIASTOLIC BLOOD PRESSURE: 84 MMHG | TEMPERATURE: 98 F | HEART RATE: 80 BPM | SYSTOLIC BLOOD PRESSURE: 134 MMHG | WEIGHT: 277.75 LBS | RESPIRATION RATE: 18 BRPM | OXYGEN SATURATION: 98 % | BODY MASS INDEX: 42.24 KG/M2
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
## 2024-08-09 DIAGNOSIS — M25.562 ACUTE PAIN OF LEFT KNEE: Primary | ICD-10-CM

## 2024-08-09 LAB
B-HCG UR QL: NEGATIVE
CTP QC/QA: YES

## 2024-08-09 PROCEDURE — 99282 EMERGENCY DEPT VISIT SF MDM: CPT

## 2024-08-09 PROCEDURE — 81025 URINE PREGNANCY TEST: CPT | Performed by: NURSE PRACTITIONER

## 2024-08-09 NOTE — ED PROVIDER NOTES
Encounter Date: 8/9/2024       History     Chief Complaint   Patient presents with    Knee Injury     REPORTS TRIP AND FALL PTA. REPORTS RUNNING AND TRIPPED OVER SAUSAGE. CO LT KNEE PAIN.  STATES HEARD IT POP.      The patient presents with knee pain and swelling. The onset was just prior to arrival.  The course/duration of symptoms is constant. Type of injury: trip and fall, denies LOC and any other injury. Location: left knee. The character of symptoms is pain and swelling.  The degree at present is moderate. There are exacerbating factors including movement, weight bearing and walking.  The relieving factor is rest. Risk factors consist of none. Prior episodes: none. Therapy today: none. Associated symptoms: none.        Review of patient's allergies indicates:  No Known Allergies  Past Medical History:   Diagnosis Date    ADHD (attention deficit hyperactivity disorder)     Anxiety     Bipolar disorder, unspecified     Depression     Hyperhidrosis      Past Surgical History:   Procedure Laterality Date    ADENOIDECTOMY      TONSILLECTOMY       Family History   Problem Relation Name Age of Onset    Arthritis Mother Caitlyn     Depression Mother Caitlyn     Mental illness Mother Caitlyn     Miscarriages / Stillbirths Mother Caitlyn     No Known Problems Father      No Known Problems Sister      No Known Problems Brother      Arthritis Maternal Grandmother      Arthritis Maternal Grandfather Fco     Drug abuse Maternal Grandfather Fco     Rheum arthritis Maternal Grandfather Fco     COPD Paternal Grandfather Fco     Lupus Maternal Cousin       Social History     Tobacco Use    Smoking status: Some Days     Current packs/day: 0.02     Types: Cigarettes    Smokeless tobacco: Never   Substance Use Topics    Alcohol use: Yes     Alcohol/week: 1.0 standard drink of alcohol     Types: 1 Glasses of wine per week     Comment: socially    Drug use: Never     Review of Systems   Constitutional:  Negative for fever.    HENT:  Negative for sore throat.    Respiratory:  Negative for shortness of breath.    Cardiovascular:  Negative for chest pain.   Gastrointestinal:  Negative for nausea.   Genitourinary:  Negative for dysuria.   Musculoskeletal:  Positive for arthralgias. Negative for back pain.   Skin:  Negative for rash.   Neurological:  Negative for weakness.   Hematological:  Does not bruise/bleed easily.   All other systems reviewed and are negative.      Physical Exam     Initial Vitals [08/09/24 1331]   BP Pulse Resp Temp SpO2   134/84 80 18 97.9 °F (36.6 °C) 98 %      MAP       --         Physical Exam    Nursing note and vitals reviewed.  Constitutional: She appears well-developed and well-nourished.   HENT:   Head: Normocephalic and atraumatic.   Neck: Neck supple.   Normal range of motion.  Cardiovascular:  Normal rate, regular rhythm, normal heart sounds and intact distal pulses.           Pulmonary/Chest: Effort normal and breath sounds normal.   Abdominal: Abdomen is soft and flat. Bowel sounds are normal. There is no abdominal tenderness.   Musculoskeletal:         General: Normal range of motion.      Cervical back: Normal range of motion and neck supple.      Comments: Left Knee: mild ttp and swelling, FROM, good distal pulses, NVI, no calf ttp or swelling      Neurological: She is alert. She has normal strength.   Skin: Skin is warm and dry.   Psychiatric: She has a normal mood and affect.         ED Course   Procedures  Labs Reviewed   POCT URINE PREGNANCY       Result Value    POC Preg Test, Ur Negative       Acceptable Yes            Imaging Results    None          Medications - No data to display  Medical Decision Making  The patient presents with knee pain and swelling. The onset was just prior to arrival.  The course/duration of symptoms is constant. Type of injury: trip and fall, denies LOC and any other injury. Location: left knee. The character of symptoms is pain and swelling.  The  degree at present is moderate. There are exacerbating factors including movement, weight bearing and walking.  The relieving factor is rest. Risk factors consist of none. Prior episodes: none. Therapy today: none. Associated symptoms: none.        Amount and/or Complexity of Data Reviewed  Labs: ordered.  Radiology: ordered.      Additional MDM:   Differential Diagnosis:   At this time differential diagnosis is but not limited to fracture, sprain, contusion, arthritis                                     Clinical Impression:  Final diagnoses:  [M25.562] Acute pain of left knee (Primary)  [W19.XXXA] Fall, initial encounter          ED Disposition Condition    Eloped Stable                Kvng Menjivar, [facility]  08/09/24 4468

## 2024-09-09 ENCOUNTER — OFFICE VISIT (OUTPATIENT)
Dept: OPHTHALMOLOGY | Facility: CLINIC | Age: 19
End: 2024-09-09
Payer: COMMERCIAL

## 2024-09-09 VITALS — HEIGHT: 68 IN | BODY MASS INDEX: 42.1 KG/M2 | WEIGHT: 277.75 LBS

## 2024-09-09 DIAGNOSIS — H47.20 OPTIC ATROPHY: ICD-10-CM

## 2024-09-09 DIAGNOSIS — H15.031 POSTERIOR SCLERITIS OF RIGHT EYE: Primary | ICD-10-CM

## 2024-09-09 DIAGNOSIS — H46.9 OPTIC NEURITIS, RIGHT: ICD-10-CM

## 2024-09-09 PROCEDURE — 92133 CPTRZD OPH DX IMG PST SGM ON: CPT | Mod: PBBFAC,PN | Performed by: STUDENT IN AN ORGANIZED HEALTH CARE EDUCATION/TRAINING PROGRAM

## 2024-09-09 PROCEDURE — 92133 CPTRZD OPH DX IMG PST SGM ON: CPT | Mod: PBBFAC,PN | Performed by: OPHTHALMOLOGY

## 2024-09-09 PROCEDURE — 99213 OFFICE O/P EST LOW 20 MIN: CPT | Mod: PBBFAC,PN | Performed by: STUDENT IN AN ORGANIZED HEALTH CARE EDUCATION/TRAINING PROGRAM

## 2024-09-09 NOTE — PROGRESS NOTES
HPI     Eye Exam     Additional comments: Pupil Ck           Comments    Posterior scleritis of right eye          Last edited by Mary Kate Soriano LPN on 9/9/2024  2:08 PM.        Assessment /Plan     For exam results, see Encounter Report.    Posterior scleritis of right eye    Optic neuritis, right             HVF 24-2              4/5/24: OD reliable, dense inf arcuate, SNS // OS unreliable, full     OCT RNFL              4/5/24: 179 white T/N/I, green S // 92, All Green   5/3/24: 90, Yellow S, Green T/N/I // 99, All Green    9/9/24: 54 red sti, yellow nasally// 103 all green     Optos 4/5/24: 2+ ONH edema with mild pallor OD // p/s, small cup left eye   9/9/24: 2+ pallor, macula and periphery within nl// ps small cup OS     IVFA 4/10/24:   OD: media clear, window defect circumferentially around nerve 2-3DD. Leakage right nerve, periphery wnl  OS: media clear, staining (likely) nasal nerve, macula and periphery wnl     1. 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 Had some improvement of symptoms.  - Initial examination 3/23/24: 2 weeks of blurred vision right eye, sudden onset first noticed in the morning without worse pain than usual. No improvement. Started on 60mg PO pred 3/22/24, sent to ED for evaluation.  -Right eye: 20/800, noel APD. Spared SN visual field, complete loss IN, relative loss ST/IT. 3+ ONH edema with tracking fluid and mild heme. Left eye: unremarkable except <0.1 cup/disc  3/24/24: 20/100 near with eccentric viewing (with great difficulty). Still inf > sup visual field depression, relative ST > SN sparing.   3/25/24: VA much improved, 20/25 near. Improving visual field especially superiorly. MRI negative and good response to steroids. 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 Plan to do 500mg solu medrol today and tomorrow. LP tomorrow then likely discharge  pending attending evaluation.  - Labwork: has had extensive infectious and inflammatory workup for scleritis performed previously              Negative: ESR/CRP/HLAB27/ACE/lysozyme/CCP/ANCA/quant gold/hep panel, CSF studies, hypercoagulable labs except below, NMO/MOG serum abs, IGG4, soluble IL-2 receptor, IL6              Previous+ (Jose R): +dsDNA w/ neg ROSITA panel, +RF    - Repeat dsDNA and TATY negative on 4/16/24  Negative: HIV, syphilis, homocysteine, fibrinogen, d-dimer, INR, APTT (slightly elevated), B1/B12 wnl, DRVVT wnl   Positive: lipoprotein A (139, ULN 75)  - Imaging:  CT brain/orbit w/wo 3/23/24 unremarkable   MRI/MRV brain/orbit w/wo 3/25/24 unremarkable  MRI Brain/Orbit w/wo 4/15/24 unremarkable   - 4/5/24: still with poor eccentric vision of 20/70 near. VF with strong inferior arcuate defect and superior nasal defect. Left eye unreliable but without gross defect. Only positive on workup has been lipoprotein A, which can be associated with NAION. VF could be consistent with it, but still quite unusual for this age group. She also has had slightly elevated LDL in the past. Unclear what the etiology is at this time. Will order further hypercoagulable workup, along with vitamin and a few others. We will place referral for second opinion with Dr. Eliud Lombardi in [place] and appreciate his assistance.  4/13/24: VA stable, exam with possible early macular star with faint linear hyperreflective exudates nasal to fovea, ONH edema stable/improved   - Completed 2nd round of 1g IV Methylpred x3days and Doxycyline 100mg PO BID x 10 days without improvement   - Dr Eliud Lombardi ([facility] in [place]) who reviewed case, likely dx is Posterior Scleritis and recommends Rheumatology Eval for starting Humira.  - 5/3/24: Repeat workup negative, VA unchanged. No new/worsening sx. Currently on PO Pred 40mg, tapering per Dr Odell   - Slight improvement in ONH edema on exam/OCT, optos taken, Bscan uploaded to media   - Started  Humira ~4/16/24  -9/9/24: on mtx and humira, off pred since early 6/2024. Would like to be set up with dr tafoya again (was seeing her at her private practice). Will message resident at [facility] to set pt up and referral sent. Edema fully resolved on exam and octn now with atrophy. Will observe for now. Can follow with us if now stable, doesn't need to see both lottie and us in future.   - Monocular precautions discussed, polycarb MRx provided 5/2024     6 months octn nerve check, (vf 24-2 prior)

## 2025-01-05 ENCOUNTER — PATIENT MESSAGE (OUTPATIENT)
Dept: OPHTHALMOLOGY | Facility: CLINIC | Age: 20
End: 2025-01-05
Payer: COMMERCIAL

## 2025-01-10 ENCOUNTER — TELEPHONE (OUTPATIENT)
Dept: OPHTHALMOLOGY | Facility: CLINIC | Age: 20
End: 2025-01-10
Payer: COMMERCIAL

## 2025-01-10 NOTE — TELEPHONE ENCOUNTER
Received paperwork , that patient requested to be completed for Certification of Mobility Impairment, requesting Permanently Impaired status. Dr. Dudley reviewed patients recent office visit, patient does not qualify , patient is count fingers in right eye but has 20/20 vision in her left eye and is otherwise healthy with no mobility limitations. Called patient to make aware and she verbalized understanding, she stated she was just having some issues navigating, driving around campus. Discussed referral to occupational therapy at [facility] for a drivers evaluation, patient is not interested at this time, instructed she can contact us at anytime if she feels this referral may benefit her, states she appreciated, but thinks she is ok at this time.

## 2025-03-11 ENCOUNTER — OFFICE VISIT (OUTPATIENT)
Dept: OPHTHALMOLOGY | Facility: CLINIC | Age: 20
End: 2025-03-11
Payer: COMMERCIAL

## 2025-03-11 VITALS — WEIGHT: 277.75 LBS | HEIGHT: 68 IN | BODY MASS INDEX: 42.1 KG/M2

## 2025-03-11 DIAGNOSIS — H47.20 OPTIC ATROPHY: Primary | ICD-10-CM

## 2025-03-11 PROCEDURE — 99213 OFFICE O/P EST LOW 20 MIN: CPT | Mod: PBBFAC,PN

## 2025-03-11 NOTE — PROGRESS NOTES
Assessment /Plan     For exam results, see Encounter Report.    Optic atrophy               HVF 24-2              4/5/24: OD reliable, dense inf arcuate, SNS // OS unreliable, full     OCT RNFL              4/5/24: 179 white T/N/I, green S // 92, All Green   5/3/24: 90, Yellow S, Green T/N/I // 99, All Green    9/9/24: 54 red sti, yellow nasally// 103 all green     Optos 4/5/24: 2+ ONH edema with mild pallor OD // p/s, small cup left eye   9/9/24: 2+ pallor, macula and periphery within nl// ps small cup OS     IVFA 4/10/24:   OD: media clear, window defect circumferentially around nerve 2-3DD. Leakage right nerve, periphery wnl  OS: media clear, staining (likely) nasal nerve, macula and periphery wnl     1. 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 Had some improvement of symptoms.  - Initial examination 3/23/24: 2 weeks of blurred vision right eye, sudden onset first noticed in the morning without worse pain than usual. No improvement. Started on 60mg PO pred 3/22/24, sent to ED for evaluation.  -Right eye: 20/800, noel APD. Spared SN visual field, complete loss IN, relative loss ST/IT. 3+ ONH edema with tracking fluid and mild heme. Left eye: unremarkable except <0.1 cup/disc  3/24/24: 20/100 near with eccentric viewing (with great difficulty). Still inf > sup visual field depression, relative ST > SN sparing.   3/25/24: VA much improved, 20/25 near. Improving visual field especially superiorly. MRI negative and good response to steroids. 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 Plan to do 500mg solu medrol today and tomorrow. LP tomorrow then likely discharge pending attending evaluation.  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 VF with strong inferior arcuate defect and superior nasal defect. Left eye unreliable but without gross defect. Only positive on workup has been lipoprotein A, which can be associated with NAION. VF could be consistent with it, but still quite unusual for this age group. She also has had slightly elevated LDL in the past. Unclear what the etiology is at this time. Will order further hypercoagulable workup, along with vitamin and a few others. We will place referral for second opinion with Dr. Eliud Lombardi in [place] and appreciate his assistance.  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  - 5/3/24: Repeat workup negative, VA unchanged. No new/worsening sx. Currently on PO Pred 40mg, tapering per Dr Odell   - Slight improvement in ONH edema on exam/OCT, optos taken, Bscan uploaded to media   - Started Humira ~4/16/24  -9/9/24: on mtx and humira, off pred since early 6/2024. Would like to be set up with dr odell again (was seeing her at her private practice). Will message resident at [facility] to set pt up and referral  sent. Edema fully resolved on exam and octn now with atrophy. Will observe for now. Can follow with us if now stable, doesn't need to see both lottie and us in future.   - Monocular precautions discussed, polycarb MRx provided 5/2024   - 12/8/24: Seen by Dr. Odell. Was quiet at that time. Noted to have ONH pallor and mild elevation of the right eye.  - 3/11/25: VA stable. ONH pallor without edema. Patient prefers to see us as she lives closer. Patient states she may be stopped on methotrexate by rheum soon, asked patient to call us if she notices any changes. Return precautions discussed. Patient scheduled to have VF done soon.    RTC for HVF as scheduled  RTC 6 months for OCT RNFL and DFE

## 2025-03-14 ENCOUNTER — CLINICAL SUPPORT (OUTPATIENT)
Dept: OPHTHALMOLOGY | Facility: CLINIC | Age: 20
End: 2025-03-14
Payer: COMMERCIAL

## 2025-03-14 DIAGNOSIS — H46.9 OPTIC NEURITIS, RIGHT: Primary | ICD-10-CM

## 2025-03-14 NOTE — PROGRESS NOTES
HPI    Here for HVF 24-2 for Right Optic Nerve Head Edema.   Last edited by Aneta Hopper LPN on 3/14/2025  2:44 PM.            Assessment /Plan     For exam results, see Encounter Report.    Optic neuritis, right  -     Rendon Visual Field - Intermediate - OU - Both Eyes; Future      HVF 24-2 done 03/14/25 BB.

## 2025-03-17 ENCOUNTER — HOSPITAL ENCOUNTER (EMERGENCY)
Facility: HOSPITAL | Age: 20
Discharge: HOME | End: 2025-03-17
Attending: EMERGENCY MEDICINE
Payer: COMMERCIAL

## 2025-03-17 VITALS
SYSTOLIC BLOOD PRESSURE: 133 MMHG | RESPIRATION RATE: 20 BRPM | OXYGEN SATURATION: 99 % | BODY MASS INDEX: 34.86 KG/M2 | HEART RATE: 83 BPM | DIASTOLIC BLOOD PRESSURE: 92 MMHG | HEIGHT: 68 IN | TEMPERATURE: 99 F | WEIGHT: 230 LBS

## 2025-03-17 DIAGNOSIS — K13.79 UVULAR SWELLING: Primary | ICD-10-CM

## 2025-03-17 LAB
FLUAV AG UPPER RESP QL IA.RAPID: NOT DETECTED
FLUBV AG UPPER RESP QL IA.RAPID: NOT DETECTED
SARS-COV-2 RNA RESP QL NAA+PROBE: NOT DETECTED
STREP A PCR (OHS): NOT DETECTED

## 2025-03-17 PROCEDURE — 99283 EMERGENCY DEPT VISIT LOW MDM: CPT

## 2025-03-17 PROCEDURE — 87651 STREP A DNA AMP PROBE: CPT | Performed by: STUDENT IN AN ORGANIZED HEALTH CARE EDUCATION/TRAINING PROGRAM

## 2025-03-17 PROCEDURE — 0240U COVID/FLU A&B PCR: CPT | Performed by: STUDENT IN AN ORGANIZED HEALTH CARE EDUCATION/TRAINING PROGRAM

## 2025-03-17 RX ORDER — FAMOTIDINE 20 MG/1
20 TABLET, FILM COATED ORAL 2 TIMES DAILY
Qty: 20 TABLET | Refills: 0 | Status: SHIPPED | OUTPATIENT
Start: 2025-03-17 | End: 2026-03-17

## 2025-03-17 RX ORDER — LORATADINE 10 MG/1
10 TABLET ORAL DAILY
Qty: 60 TABLET | Refills: 0 | Status: SHIPPED | OUTPATIENT
Start: 2025-03-17 | End: 2026-03-17

## 2025-03-17 RX ORDER — METHYLPREDNISOLONE 4 MG/1
TABLET ORAL
Qty: 1 EACH | Refills: 0 | Status: SHIPPED | OUTPATIENT
Start: 2025-03-17

## 2025-03-17 NOTE — ED PROVIDER NOTES
Encounter Date: 3/17/2025    SCRIBE #1 NOTE: I, Amelie Florence, am scribing for, and in the presence of,  Chet Dumont MD. I have scribed the following portions of the note - Other sections scribed: HPI, ROS, PE.       History     Chief Complaint   Patient presents with    Oral Swelling     Pt ambulatory to triage and presents via POV. States that she feels like her throat is closing up and that her uvula is swollen. Pt began feeling this sensation last night and took 2 benadryl prior to bed and today prior to arrival with no relief of symptoms. Denies any new medications or foods that could have caused a reaction. Pt able to speak and o2 98% RA in triage.      Patient is a 20-year-old female with a PMHx of ADHD, anxiety, bipolar disorder, depression, and hyperhidrosis presents to the ED for throat and uvula swelling beginning last night. Patient reports taking benadryl yesterday with no relief. She reports post nasal drip, runny nose, and nasal congestion. She reports difficulty talking and gagging. She reports she started taking Flonase recently. She denies wheezing.     The history is provided by the patient and medical records. No  was used.     Review of patient's allergies indicates:  No Known Allergies  Past Medical History:   Diagnosis Date    ADHD (attention deficit hyperactivity disorder)     Anxiety     Bipolar disorder, unspecified     Depression     Hyperhidrosis      Past Surgical History:   Procedure Laterality Date    ADENOIDECTOMY      TONSILLECTOMY       Family History   Problem Relation Name Age of Onset    Arthritis Mother Caitlyn     Depression Mother Caitlyn     Mental illness Mother Caitlyn     Miscarriages / Stillbirths Mother Caitlyn     No Known Problems Father      No Known Problems Sister      No Known Problems Brother      Arthritis Maternal Grandmother      Arthritis Maternal Grandfather Fco     Drug abuse Maternal Grandfather Fco     Rheum arthritis Maternal  Grandfather Fco     COPD Paternal Grandfather Fco     Lupus Maternal Cousin       Social History[1]  Review of Systems   HENT:  Positive for postnasal drip and rhinorrhea.         Positive for nasal congestion and throat swelling.   Respiratory:  Negative for wheezing.        Physical Exam     Initial Vitals [03/17/25 0722]   BP Pulse Resp Temp SpO2   (!) 133/92 88 20 98.5 °F (36.9 °C) 98 %      MAP       --         Physical Exam    Nursing note and vitals reviewed.  Constitutional: She appears well-developed and well-nourished. No distress.   HENT:   Head: Normocephalic and atraumatic. Mouth/Throat: Uvula swelling present.   Soft palate erythematous.   Eyes: EOM are normal. Right eye exhibits no discharge. Left eye exhibits no discharge. No scleral icterus.   Neck: Neck supple. No stridor present. No tracheal deviation present.   Cardiovascular:  Normal rate, regular rhythm and intact distal pulses.           No murmur heard.  Pulmonary/Chest: Breath sounds normal. No stridor. No respiratory distress. She has no wheezes. She has no rhonchi.   Abdominal: She exhibits no distension. There is no abdominal tenderness. There is no rebound and no guarding.   Musculoskeletal:         General: No tenderness or edema. Normal range of motion.      Cervical back: Neck supple.     Neurological: She is alert and oriented to person, place, and time. She has normal strength.   Skin: Skin is dry. No rash noted. No erythema. No pallor.   Psychiatric: She has a normal mood and affect. Her behavior is normal. Judgment and thought content normal.         ED Course   Procedures  Labs Reviewed   STREP GROUP A BY PCR - Normal       Result Value    STREP A PCR ([facility]) Not Detected      Narrative:     The Xpert Xpress Strep A test is a rapid, qualitative in vitro diagnostic test for the detection of Streptococcus pyogenes (Group A ß-hemolytic Streptococcus, Strep A) in throat swab specimens from patients with signs and symptoms of  pharyngitis.     COVID/FLU A&B PCR          Imaging Results    None          Medications - No data to display  Medical Decision Making  The differential diagnosis includes, but is not limited to, swelling of uvula, strep throat, and allergic reaction.    Amount and/or Complexity of Data Reviewed  Labs: ordered.    Risk  OTC drugs.  Prescription drug management.            Scribe Attestation:   Scribe #1: I performed the above scribed service and the documentation accurately describes the services I performed. I attest to the accuracy of the note.    Attending Attestation:           Physician Attestation for Scribe:  Physician Attestation Statement for Scribe #1: I, Chet Dumont MD, reviewed documentation, as scribed by Amelie Florence in my presence, and it is both accurate and complete.                                    Clinical Impression:  Final diagnoses:  [K13.79] Uvular swelling (Primary)          ED Disposition Condition    Discharge Stable          ED Prescriptions       Medication Sig Dispense Start Date End Date Auth. Provider    methylPREDNISolone (MEDROL DOSEPACK) 4 mg tablet Take one 21 tablet dosepak per package directions 1 each 3/17/2025 -- Chet Dumont MD    loratadine (CLARITIN) 10 mg tablet Take 1 tablet (10 mg total) by mouth once daily. 60 tablet 3/17/2025 3/17/2026 Chet Dumont MD    famotidine (PEPCID) 20 MG tablet Take 1 tablet (20 mg total) by mouth 2 (two) times daily. 20 tablet 3/17/2025 3/17/2026 Chet Dumont MD          Follow-up Information       Follow up With Specialties Details Why Contact Jayda aBshir, DO Internal Medicine In 2 days  [address]  [place] [address]  [phone]                   [1]   Social History  Tobacco Use    Smoking status: Some Days     Current packs/day: 0.02     Types: Cigarettes    Smokeless tobacco: Never   Substance Use Topics    Alcohol use: Yes     Alcohol/week: 1.0 standard drink of alcohol     Types:  1 Glasses of wine per week     Comment: socially    Drug use: Never        Chet Dumont MD  03/17/25 0848

## 2025-04-27 ENCOUNTER — HOSPITAL ENCOUNTER (EMERGENCY)
Facility: HOSPITAL | Age: 20
Discharge: HOME | End: 2025-04-27
Attending: EMERGENCY MEDICINE
Payer: COMMERCIAL

## 2025-04-27 VITALS
WEIGHT: 245 LBS | OXYGEN SATURATION: 98 % | HEIGHT: 68 IN | RESPIRATION RATE: 17 BRPM | SYSTOLIC BLOOD PRESSURE: 119 MMHG | HEART RATE: 77 BPM | TEMPERATURE: 98 F | BODY MASS INDEX: 37.13 KG/M2 | DIASTOLIC BLOOD PRESSURE: 81 MMHG

## 2025-04-27 DIAGNOSIS — R53.1 GENERALIZED WEAKNESS: ICD-10-CM

## 2025-04-27 DIAGNOSIS — E86.0 DEHYDRATION: Primary | ICD-10-CM

## 2025-04-27 LAB
ALBUMIN SERPL-MCNC: 3.8 G/DL (ref 3.5–5)
ALBUMIN/GLOB SERPL: 1 RATIO (ref 1.1–2)
ALP SERPL-CCNC: 58 UNIT/L (ref 40–150)
ALT SERPL-CCNC: 16 UNIT/L (ref 0–55)
ANION GAP SERPL CALC-SCNC: 8 MEQ/L
AST SERPL-CCNC: 19 UNIT/L (ref 11–45)
B-HCG UR QL: NEGATIVE
BACTERIA #/AREA URNS AUTO: ABNORMAL /HPF
BASOPHILS # BLD AUTO: 0.07 X10(3)/MCL
BASOPHILS NFR BLD AUTO: 0.6 %
BILIRUB SERPL-MCNC: 0.2 MG/DL
BILIRUB UR QL STRIP.AUTO: NEGATIVE
BUN SERPL-MCNC: 12.6 MG/DL (ref 7–18.7)
CALCIUM SERPL-MCNC: 10 MG/DL (ref 8.4–10.2)
CHLORIDE SERPL-SCNC: 107 MMOL/L (ref 98–107)
CLARITY UR: CLEAR
CO2 SERPL-SCNC: 24 MMOL/L (ref 22–29)
COLOR UR AUTO: ABNORMAL
CREAT SERPL-MCNC: 0.79 MG/DL (ref 0.55–1.02)
CREAT/UREA NIT SERPL: 16
EOSINOPHIL # BLD AUTO: 0.32 X10(3)/MCL (ref 0–0.9)
EOSINOPHIL NFR BLD AUTO: 2.9 %
ERYTHROCYTE [DISTWIDTH] IN BLOOD BY AUTOMATED COUNT: 15.3 % (ref 11.5–17)
GFR SERPLBLD CREATININE-BSD FMLA CKD-EPI: >60 ML/MIN/1.73/M2
GLOBULIN SER-MCNC: 3.7 GM/DL (ref 2.4–3.5)
GLUCOSE SERPL-MCNC: 90 MG/DL (ref 74–100)
GLUCOSE UR QL STRIP: NORMAL
HCT VFR BLD AUTO: 37.4 % (ref 37–47)
HGB BLD-MCNC: 11.5 G/DL (ref 12–16)
HGB UR QL STRIP: NEGATIVE
IMM GRANULOCYTES # BLD AUTO: 0.03 X10(3)/MCL (ref 0–0.04)
IMM GRANULOCYTES NFR BLD AUTO: 0.3 %
KETONES UR QL STRIP: NEGATIVE
LEUKOCYTE ESTERASE UR QL STRIP: NEGATIVE
LYMPHOCYTES # BLD AUTO: 4.56 X10(3)/MCL (ref 0.6–4.6)
LYMPHOCYTES NFR BLD AUTO: 42 %
MCH RBC QN AUTO: 24.2 PG (ref 27–31)
MCHC RBC AUTO-ENTMCNC: 30.7 G/DL (ref 33–36)
MCV RBC AUTO: 78.6 FL (ref 80–94)
MONOCYTES # BLD AUTO: 0.72 X10(3)/MCL (ref 0.1–1.3)
MONOCYTES NFR BLD AUTO: 6.6 %
MUCOUS THREADS URNS QL MICRO: ABNORMAL /LPF
NEUTROPHILS # BLD AUTO: 5.17 X10(3)/MCL (ref 2.1–9.2)
NEUTROPHILS NFR BLD AUTO: 47.6 %
NITRITE UR QL STRIP: NEGATIVE
NRBC BLD AUTO-RTO: 0 %
PH UR STRIP: 5.5 [PH]
PLATELET # BLD AUTO: 365 X10(3)/MCL (ref 130–400)
PMV BLD AUTO: 10.2 FL (ref 7.4–10.4)
POTASSIUM SERPL-SCNC: 4 MMOL/L (ref 3.5–5.1)
PROT SERPL-MCNC: 7.5 GM/DL (ref 6.4–8.3)
PROT UR QL STRIP: NEGATIVE
RBC # BLD AUTO: 4.76 X10(6)/MCL (ref 4.2–5.4)
RBC #/AREA URNS AUTO: ABNORMAL /HPF
SODIUM SERPL-SCNC: 139 MMOL/L (ref 136–145)
SP GR UR STRIP.AUTO: 1.02 (ref 1–1.03)
SQUAMOUS #/AREA URNS LPF: ABNORMAL /HPF
UROBILINOGEN UR STRIP-ACNC: NORMAL
WBC # BLD AUTO: 10.87 X10(3)/MCL (ref 4.5–11.5)
WBC #/AREA URNS AUTO: ABNORMAL /HPF

## 2025-04-27 PROCEDURE — 96374 THER/PROPH/DIAG INJ IV PUSH: CPT

## 2025-04-27 PROCEDURE — 85025 COMPLETE CBC W/AUTO DIFF WBC: CPT | Performed by: PHYSICIAN ASSISTANT

## 2025-04-27 PROCEDURE — 81025 URINE PREGNANCY TEST: CPT | Performed by: PHYSICIAN ASSISTANT

## 2025-04-27 PROCEDURE — 63600175 PHARM REV CODE 636 W HCPCS: Performed by: PHYSICIAN ASSISTANT

## 2025-04-27 PROCEDURE — 99284 EMERGENCY DEPT VISIT MOD MDM: CPT | Mod: 25

## 2025-04-27 PROCEDURE — 81001 URINALYSIS AUTO W/SCOPE: CPT | Performed by: PHYSICIAN ASSISTANT

## 2025-04-27 PROCEDURE — 80053 COMPREHEN METABOLIC PANEL: CPT | Performed by: PHYSICIAN ASSISTANT

## 2025-04-27 RX ORDER — METOCLOPRAMIDE HYDROCHLORIDE 5 MG/ML
10 INJECTION INTRAMUSCULAR; INTRAVENOUS
Status: COMPLETED | OUTPATIENT
Start: 2025-04-27 | End: 2025-04-27

## 2025-04-27 RX ADMIN — SODIUM CHLORIDE, POTASSIUM CHLORIDE, SODIUM LACTATE AND CALCIUM CHLORIDE 1000 ML: 600; 310; 30; 20 INJECTION, SOLUTION INTRAVENOUS at 08:04

## 2025-04-27 RX ADMIN — METOCLOPRAMIDE 10 MG: 5 INJECTION, SOLUTION INTRAMUSCULAR; INTRAVENOUS at 08:04

## 2025-04-28 NOTE — FIRST PROVIDER EVALUATION
Medical screening examination initiated.  I have conducted a focused provider triage encounter, findings are as follows:    Brief history of present illness:  20yoWF presents with weakness and feeling lightheaded x today. Walking around outside at festival. Had one drink today. No drug use.     There were no vitals filed for this visit.    Pertinent physical exam:  NAd    Brief workup plan:  labs and imaging    Preliminary workup initiated; this workup will be continued and followed by the physician or advanced practice provider that is assigned to the patient when roomed.

## 2025-04-28 NOTE — ED PROVIDER NOTES
"Encounter Date: 4/27/2025    SCRIBE #1 NOTE: I, Ina Menjivar, am scribing for, and in the presence of,  Anjali Zuñiga MD. I have scribed the following portions of the note - Other sections scribed: HPI, ROS, PE.       History     Chief Complaint   Patient presents with    Weakness     Was at festival today, states she was extremely dehydrated and "woozy" drank 2 32 oz bottles of water, but still didn't feel right. AAOx4, ambulatory in triage.      Patient is a 20-year-old female with a history of anxiety, bipolar disorder, and depression presenting to the ED with c/o generalized weakness. The pt states she was outside at the festival when she became globally weak. She reports drinking water but states she still felt weak afterwards. She notes that this has happened before in the past when she has been outside in the heat. She denies any nausea or vomiting. She states she feels completely back to baseline now after receiving fluids in the ED.    The history is provided by the patient. No  was used.     Review of patient's allergies indicates:  No Known Allergies  Past Medical History:   Diagnosis Date    ADHD (attention deficit hyperactivity disorder)     Anxiety     Bipolar disorder, unspecified     Depression     Hyperhidrosis      Past Surgical History:   Procedure Laterality Date    ADENOIDECTOMY      TONSILLECTOMY       Family History   Problem Relation Name Age of Onset    Arthritis Mother Caitlyn     Depression Mother Caitlyn     Mental illness Mother Caitlyn     Miscarriages / Stillbirths Mother Caitlyn     No Known Problems Father      No Known Problems Sister      No Known Problems Brother      Arthritis Maternal Grandmother      Arthritis Maternal Grandfather Fco     Drug abuse Maternal Grandfather Fco     Rheum arthritis Maternal Grandfather Fco     COPD Paternal Grandfather Fco     Lupus Maternal Cousin       Social History[1]  Review of Systems   Constitutional:         " Generalized weakness.   Gastrointestinal:  Negative for nausea and vomiting.       Physical Exam     Initial Vitals [04/27/25 2014]   BP Pulse Resp Temp SpO2   119/81 77 17 97.8 °F (36.6 °C) 98 %      MAP       --         Physical Exam    Nursing note and vitals reviewed.  Constitutional: She appears well-developed and well-nourished. She is not diaphoretic. No distress.   HENT:   Head: Normocephalic and atraumatic.   Nose: Nose normal. Mouth/Throat: Oropharynx is clear and moist.   Eyes: Conjunctivae and EOM are normal. Pupils are equal, round, and reactive to light.   Neck: Trachea normal. Neck supple.   Normal range of motion.  Cardiovascular:  Normal rate, regular rhythm, normal heart sounds and intact distal pulses.           No murmur heard.  Pulmonary/Chest: Breath sounds normal. No respiratory distress. She has no wheezes. She has no rhonchi. She has no rales. She exhibits no tenderness.   Abdominal: Abdomen is soft. Bowel sounds are normal. She exhibits no distension and no mass. There is no abdominal tenderness. There is no rebound and no guarding.   Musculoskeletal:         General: No tenderness or edema. Normal range of motion.      Cervical back: Normal range of motion and neck supple.      Lumbar back: Normal. Normal range of motion.     Neurological: She is alert and oriented to person, place, and time. She has normal strength. No cranial nerve deficit or sensory deficit.   Skin: Skin is warm and dry. Capillary refill takes less than 2 seconds. No abscess noted. No erythema. No pallor.   Psychiatric: She has a normal mood and affect. Her behavior is normal. Judgment and thought content normal.         ED Course   Procedures  Labs Reviewed   URINALYSIS, REFLEX TO URINE CULTURE - Abnormal       Result Value    Color, UA Light-Yellow      Appearance, UA Clear      Specific Gravity, UA 1.023      pH, UA 5.5      Protein, UA Negative      Glucose, UA Normal      Ketones, UA Negative      Blood, UA  Negative      Bilirubin, UA Negative      Urobilinogen, UA Normal      Nitrites, UA Negative      Leukocyte Esterase, UA Negative      RBC, UA 0-5      WBC, UA 0-5      Bacteria, UA Trace      Squamous Epithelial Cells, UA Trace      Mucous, UA Trace (*)    COMPREHENSIVE METABOLIC PANEL - Abnormal    Sodium 139      Potassium 4.0      Chloride 107      CO2 24      Glucose 90      Blood Urea Nitrogen 12.6      Creatinine 0.79      Calcium 10.0      Protein Total 7.5      Albumin 3.8      Globulin 3.7 (*)     Albumin/Globulin Ratio 1.0 (*)     Bilirubin Total 0.2      ALP 58      ALT 16      AST 19      eGFR >60      Anion Gap 8.0      BUN/Creatinine Ratio 16     CBC WITH DIFFERENTIAL - Abnormal    WBC 10.87      RBC 4.76      Hgb 11.5 (*)     Hct 37.4      MCV 78.6 (*)     MCH 24.2 (*)     MCHC 30.7 (*)     RDW 15.3      Platelet 365      MPV 10.2      Neut % 47.6      Lymph % 42.0      Mono % 6.6      Eos % 2.9      Basophil % 0.6      Imm Grans % 0.3      Neut # 5.17      Lymph # 4.56      Mono # 0.72      Eos # 0.32      Baso # 0.07      Imm Gran # 0.03      NRBC% 0.0     PREGNANCY TEST, URINE RAPID - Normal    hCG Qualitative, Urine Negative     CBC W/ AUTO DIFFERENTIAL    Narrative:     The following orders were created for panel order CBC auto differential.  Procedure                               Abnormality         Status                     ---------                               -----------         ------                     CBC with Differential[[id number]]       Abnormal            Final result                 Please view results for these tests on the individual orders.          Imaging Results    None          Medications   lactated ringers bolus 1,000 mL (0 mLs Intravenous Stopped 4/27/25 2057)   metoclopramide injection 10 mg (10 mg Intravenous Given 4/27/25 2032)     Medical Decision Making  Differential diagnosis include but are not limited to: dehydration and anemia. pregnancy  Cbc, cmp, ua, upt  ordered and reivewed and unremarkable  Better with antiemetics and ivf, tolerating po, dc    Problems Addressed:  Dehydration: acute illness or injury that poses a threat to life or bodily functions  Generalized weakness: acute illness or injury that poses a threat to life or bodily functions    Amount and/or Complexity of Data Reviewed  External Data Reviewed: notes.     Details: Outpt visits for scleritis    Labs: ordered. Decision-making details documented in ED Course.    Risk  OTC drugs.            Scribe Attestation:   Scribe #1: I performed the above scribed service and the documentation accurately describes the services I performed. I attest to the accuracy of the note.  Comments: Attending:   Physician Attestation Statement for Scribe #1: IAnjali MD, personally performed the services described in this documentation. All medical record entries made by the scribe were at my direction and in my presence.  I have reviewed the chart and agree that the record reflects my personal performance and is accurate and complete.        Attending Attestation:           Physician Attestation for Scribe:  Physician Attestation Statement for Scribe #1: IAnjali MD, reviewed documentation, as scribed by Ina Menjivar in my presence, and it is both accurate and complete.                                    Clinical Impression:  Final diagnoses:  [E86.0] Dehydration (Primary)  [R53.1] Generalized weakness          ED Disposition Condition    Discharge Good          ED Prescriptions    None       Follow-up Information       Follow up With Specialties Details Why Contact Jayda Bashir,  Internal Medicine Schedule an appointment as soon as possible for a visit   [address] [facility] [address] [phone]      [facility] Emergency Medicine  As needed, If symptoms worsen [address] [facility] [id number] [phone]               [1]    Social History  Tobacco Use    Smoking status: Some Days     Current packs/day: 0.02     Types: Cigarettes    Smokeless tobacco: Never   Substance Use Topics    Alcohol use: Yes     Alcohol/week: 1.0 standard drink of alcohol     Types: 1 Glasses of wine per week     Comment: socially    Drug use: Never        Anjali Zuñiga MD  04/27/25 9088